# Patient Record
Sex: FEMALE | Race: WHITE | NOT HISPANIC OR LATINO | Employment: FULL TIME | URBAN - METROPOLITAN AREA
[De-identification: names, ages, dates, MRNs, and addresses within clinical notes are randomized per-mention and may not be internally consistent; named-entity substitution may affect disease eponyms.]

---

## 2020-12-14 ENCOUNTER — HOSPITAL ENCOUNTER (OUTPATIENT)
Dept: OTHER | Facility: HOSPITAL | Age: 35
Discharge: HOME | End: 2020-12-14

## 2020-12-14 LAB
ALBUMIN SERPL-MCNC: 4.2 G/DL (ref 3.5–5)
ALBUMIN/GLOB SERPL: 1.3 {RATIO} (ref 1.4–2.6)
ALP SERPL-CCNC: 107 U/L (ref 42–98)
ALT SERPL-CCNC: 21 U/L (ref 10–40)
ANION GAP SERPL CALC-SCNC: 14 MMOL/L (ref 8–19)
AST SERPL-CCNC: 21 U/L (ref 15–50)
BASOPHILS # BLD AUTO: 0.12 10*3/UL (ref 0–0.2)
BASOPHILS NFR BLD AUTO: 1 % (ref 0–3)
BILIRUB SERPL-MCNC: 0.26 MG/DL (ref 0.2–1.3)
BUN SERPL-MCNC: 13 MG/DL (ref 5–25)
BUN/CREAT SERPL: 17 {RATIO} (ref 6–20)
CALCIUM SERPL-MCNC: 9.5 MG/DL (ref 8.7–10.4)
CHLORIDE SERPL-SCNC: 103 MMOL/L (ref 99–111)
CONV ABS IMM GRAN: 0.07 10*3/UL (ref 0–0.2)
CONV CO2: 28 MMOL/L (ref 22–32)
CONV HIV-1/ HIV-2: NONREACTIVE
CONV IMMATURE GRAN: 0.6 % (ref 0–1.8)
CONV TOTAL PROTEIN: 7.5 G/DL (ref 6.3–8.2)
CREAT UR-MCNC: 0.75 MG/DL (ref 0.5–0.9)
DEPRECATED RDW RBC AUTO: 48.1 FL (ref 36.4–46.3)
EOSINOPHIL # BLD AUTO: 0.58 10*3/UL (ref 0–0.7)
EOSINOPHIL # BLD AUTO: 4.8 % (ref 0–7)
ERYTHROCYTE [DISTWIDTH] IN BLOOD BY AUTOMATED COUNT: 14 % (ref 11.7–14.4)
GFR SERPLBLD BASED ON 1.73 SQ M-ARVRAT: >60 ML/MIN/{1.73_M2}
GLOBULIN UR ELPH-MCNC: 3.3 G/DL (ref 2–3.5)
GLUCOSE SERPL-MCNC: 96 MG/DL (ref 65–99)
HCT VFR BLD AUTO: 46 % (ref 37–47)
HGB BLD-MCNC: 14.4 G/DL (ref 12–16)
LITHIUM SERPL-SCNC: 0.7 MEQ/L (ref 0.5–1.5)
LYMPHOCYTES # BLD AUTO: 3.1 10*3/UL (ref 1–5)
LYMPHOCYTES NFR BLD AUTO: 25.8 % (ref 20–45)
MCH RBC QN AUTO: 29.1 PG (ref 27–31)
MCHC RBC AUTO-ENTMCNC: 31.3 G/DL (ref 33–37)
MCV RBC AUTO: 92.9 FL (ref 81–99)
MONOCYTES # BLD AUTO: 0.67 10*3/UL (ref 0.2–1.2)
MONOCYTES NFR BLD AUTO: 5.6 % (ref 3–10)
NEUTROPHILS # BLD AUTO: 7.46 10*3/UL (ref 2–8)
NEUTROPHILS NFR BLD AUTO: 62.2 % (ref 30–85)
NRBC CBCN: 0 % (ref 0–0.7)
OSMOLALITY SERPL CALC.SUM OF ELEC: 290 MOSM/KG (ref 273–304)
PLATELET # BLD AUTO: 393 10*3/UL (ref 130–400)
PMV BLD AUTO: 10.6 FL (ref 9.4–12.3)
POTASSIUM SERPL-SCNC: 4.7 MMOL/L (ref 3.5–5.3)
RBC # BLD AUTO: 4.95 10*6/UL (ref 4.2–5.4)
RPR SER QL: NORMAL
SODIUM SERPL-SCNC: 140 MMOL/L (ref 135–147)
WBC # BLD AUTO: 12 10*3/UL (ref 4.8–10.8)

## 2022-05-25 ENCOUNTER — HOSPITAL ENCOUNTER (OUTPATIENT)
Dept: HOSPITAL 79 - LAB | Age: 37
End: 2022-05-25
Attending: OBSTETRICS & GYNECOLOGY
Payer: COMMERCIAL

## 2022-05-25 DIAGNOSIS — Z00.00: Primary | ICD-10-CM

## 2022-05-25 LAB
BUN/CREATININE RATIO: 25 (ref 0–10)
HGB BLD-MCNC: 15.6 GM/DL (ref 12.3–15.3)
RED BLOOD COUNT: 5.41 M/UL (ref 4–5.1)
WHITE BLOOD COUNT: 8.1 K/UL (ref 4.5–11)

## 2022-05-26 LAB — VITAMIN D, 25-HYDROXY: 26.8 NG/ML (ref 30–100)

## 2022-05-27 LAB — HCV AB: >11 (ref 0–0.9)

## 2022-05-28 LAB
QUANTIFERON MITOGEN VALUE: >10 IU/ML
QUANTIFERON NIL VALUE: 0.13 IU/ML
QUANTIFERON TB1 AG VALUE: 0.14 IU/ML
QUANTIFERON TB2 AG VALUE: 0.13 IU/ML

## 2023-12-13 ENCOUNTER — OFFICE VISIT (OUTPATIENT)
Dept: FAMILY MEDICINE CLINIC | Facility: CLINIC | Age: 38
End: 2023-12-13
Payer: MEDICAID

## 2023-12-13 VITALS
HEIGHT: 64 IN | HEART RATE: 69 BPM | OXYGEN SATURATION: 99 % | BODY MASS INDEX: 28.68 KG/M2 | DIASTOLIC BLOOD PRESSURE: 78 MMHG | WEIGHT: 168 LBS | SYSTOLIC BLOOD PRESSURE: 110 MMHG

## 2023-12-13 DIAGNOSIS — F33.1 MAJOR DEPRESSIVE DISORDER, RECURRENT EPISODE, MODERATE DEGREE: ICD-10-CM

## 2023-12-13 DIAGNOSIS — Z76.89 ENCOUNTER TO ESTABLISH CARE: Primary | ICD-10-CM

## 2023-12-13 DIAGNOSIS — Z11.59 ENCOUNTER FOR HCV SCREENING TEST FOR LOW RISK PATIENT: ICD-10-CM

## 2023-12-13 DIAGNOSIS — G62.9 PERIPHERAL POLYNEUROPATHY: ICD-10-CM

## 2023-12-13 DIAGNOSIS — Z79.899 HIGH RISK MEDICATION USE: ICD-10-CM

## 2023-12-13 DIAGNOSIS — Z13.0 SCREENING FOR DEFICIENCY ANEMIA: ICD-10-CM

## 2023-12-13 DIAGNOSIS — F31.9 BIPOLAR 1 DISORDER: ICD-10-CM

## 2023-12-13 DIAGNOSIS — G47.429 NARCOLEPSY DUE TO UNDERLYING CONDITION WITHOUT CATAPLEXY: ICD-10-CM

## 2023-12-13 DIAGNOSIS — Z13.220 SCREENING FOR LIPID DISORDERS: ICD-10-CM

## 2023-12-13 DIAGNOSIS — Z11.4 SCREENING FOR HIV (HUMAN IMMUNODEFICIENCY VIRUS): ICD-10-CM

## 2023-12-13 DIAGNOSIS — G25.81 RLS (RESTLESS LEGS SYNDROME): ICD-10-CM

## 2023-12-13 RX ORDER — CHOLECALCIFEROL (VITAMIN D3) 125 MCG
CAPSULE ORAL
COMMUNITY
Start: 2023-12-02 | End: 2023-12-13 | Stop reason: SDUPTHER

## 2023-12-13 RX ORDER — BUPROPION HYDROCHLORIDE 150 MG/1
150 TABLET ORAL EVERY MORNING
Qty: 30 TABLET | Refills: 2 | Status: SHIPPED | OUTPATIENT
Start: 2023-12-13

## 2023-12-13 RX ORDER — DOXEPIN HYDROCHLORIDE 10 MG/1
CAPSULE ORAL
COMMUNITY
Start: 2023-11-11 | End: 2023-12-13 | Stop reason: SDUPTHER

## 2023-12-13 RX ORDER — BUPRENORPHINE 300 MG/1
SOLUTION SUBCUTANEOUS
COMMUNITY
Start: 2023-11-20

## 2023-12-13 RX ORDER — GABAPENTIN 400 MG/1
400 CAPSULE ORAL 3 TIMES DAILY
Qty: 90 CAPSULE | Refills: 2 | Status: SHIPPED | OUTPATIENT
Start: 2023-12-13

## 2023-12-13 RX ORDER — CHOLECALCIFEROL (VITAMIN D3) 125 MCG
5 CAPSULE ORAL NIGHTLY
Qty: 30 EACH | Refills: 2 | Status: SHIPPED | OUTPATIENT
Start: 2023-12-13

## 2023-12-13 RX ORDER — CARIPRAZINE 3 MG/1
3 CAPSULE, GELATIN COATED ORAL DAILY
Qty: 30 CAPSULE | Refills: 2 | Status: SHIPPED | OUTPATIENT
Start: 2023-12-13 | End: 2024-03-12

## 2023-12-13 RX ORDER — BUPROPION HYDROCHLORIDE 150 MG/1
TABLET ORAL
COMMUNITY
Start: 2023-11-13 | End: 2023-12-13 | Stop reason: SDUPTHER

## 2023-12-13 RX ORDER — CARIPRAZINE 3 MG/1
CAPSULE, GELATIN COATED ORAL
COMMUNITY
Start: 2023-11-14 | End: 2023-12-13 | Stop reason: SDUPTHER

## 2023-12-13 RX ORDER — ROPINIROLE 0.5 MG/1
TABLET, FILM COATED ORAL
Qty: 60 TABLET | Refills: 1 | Status: SHIPPED | OUTPATIENT
Start: 2023-12-13

## 2023-12-13 RX ORDER — DOXEPIN HYDROCHLORIDE 10 MG/1
10 CAPSULE ORAL NIGHTLY
Qty: 30 CAPSULE | Refills: 2 | Status: SHIPPED | OUTPATIENT
Start: 2023-12-13

## 2023-12-13 NOTE — PROGRESS NOTES
"    New Patient Office Visit      Date: 2023   Patient Name: Jose Coyle  : 1985   MRN: [id number]     Chief Complaint:    Chief Complaint   Patient presents with    Establish Care       History of Present Illness: Jose Coyle is a 38 y.o. female who is here today to establish care.      Subjective      HPI:  Patient recently moved from [place].  Staying in [facility].      History of polysubstance use (mainly methamphetamines, some fentanyl).  Has been sober now for 18 months.      Diagnosed w Bipolar Type 1, major depressive disorder and anxiety. Was hospitalized 10/31/23 during a \"psychosis\" episode. States that she and her  were working at [employer] and doing well up until a couple of months ago when she lost her job.  After this she abruptly stopped taking all of her medications which prompted her reason for hospitalization.  She was restarted on medications including Wellbutrin 150 mg, Vraylar 3 mg daily.  Has been on the Vraylar for 3 years now and reports she has done really well with this.  Was on lithium several years ago as well as Prozac but failed both of these. Prior to recent hospitalization her mood symptoms had been controlled.  She now still struggles with some anxiety and \"gloominess.\"     On Sublocade injections.  Needs to find a new provider to prescribe this for her.  States that she can be [facility] and plans to do this soon.    Has neuropathy and narcolepsy (diagnosed by sleep medicine physician approx 10 years ago).  States that she falls asleep even on methamphetamines.  Request sleep medicine referral.  She was also diagnosed with restless leg.  Previously took ropinirole for this which did help her symptoms at some point she never had this refilled.  Requesting to start back on this today.  She also takes doxepin and melatonin to help with sleep.    Struggles w numbness " and tingling in both feet.  Has been on gabapentin 400 mg 3 times daily which helps control her symptoms.    States that her  has been diagnosed with HCV but she has always tested negative.  They have unprotected intercourse and have shared needles in the past.    Had been previously started on Remeron after hospitalization.  Had a 30 pound weight gain over a 2-month period so this was stopped.  Eventually would like to discuss weight loss.  Feels like her diet is relatively healthy.  Typically eats 3 small meals a day.  Does admit that she eats more carbs than she probably should.  BMI is 28.84.    Review of Systems:   Negative/not pertinent unless otherwise noted above in HPI.     Past Medical History:   Past Medical History:   Diagnosis Date    Bipolar 1 disorder     Chronic pain     Depression        Past Surgical History: History reviewed. No pertinent surgical history.    Family History: History reviewed. No pertinent family history.    Social History:   Social History     Socioeconomic History    Marital status:    Tobacco Use    Smoking status: Every Day     Packs/day: 1     Types: Cigarettes    Smokeless tobacco: Never   Vaping Use    Vaping Use: Every day   Substance and Sexual Activity    Alcohol use: Not Currently    Drug use: Not Currently     Comment: fentanyl       Medications:     Current Outpatient Medications:     buPROPion XL (WELLBUTRIN XL) 150 MG 24 hr tablet, Take 1 tablet by mouth Every Morning., Disp: 30 tablet, Rfl: 2    doxepin (SINEquan) 10 MG capsule, Take 1 capsule by mouth Every Night., Disp: 30 capsule, Rfl: 2    gabapentin (NEURONTIN) 400 MG capsule, Take 1 capsule by mouth 3 (Three) Times a Day., Disp: 90 capsule, Rfl: 2    melatonin 5 MG tablet tablet, Take 1 tablet by mouth Every Night., Disp: 30 each, Rfl: 2    Sublocade 300 MG/1.5ML solution prefilled syringe, INJECT 300MG SUBCUTANEOUSLY ONCE MONTHLY, Disp: , Rfl:     Vraylar 3 MG capsule capsule, Take 1 capsule by  "mouth Daily for 90 days., Disp: 30 capsule, Rfl: 2    rOPINIRole (REQUIP) 0.5 MG tablet, Take 1/2 tablet at bedtime for three days, then increase to full tablet at bedtime for three days, then take 2 tablets at bedtime., Disp: 60 tablet, Rfl: 1    Allergies:   No Known Allergies    Immunizations:  Immunization History   Administered Date(s) Administered    Fluzone (or Fluarix & Flulaval for VFC) >6mos 12/13/2023    Hepatitis B Adolescent High Risk Infant 05/13/1999, 06/15/1999    OPV 08/24/1998    Td (TDVAX) 08/24/1998     Hep C (Age 18-79 once):  previously negative, reordered today  HIV (Age 15-65 once): previously negative, reordered today  A1c: ordered  Lipid panel: ordered      Tobacco Use: High Risk (12/13/2023)    Patient History     Smoking Tobacco Use: Every Day     Smokeless Tobacco Use: Never     Passive Exposure: Not on file       Social History     Substance and Sexual Activity   Alcohol Use Not Currently        Social History     Substance and Sexual Activity   Drug Use Not Currently    Comment: fentanyl      Sexual Health: not using contraception, not attempting pregnancy   Menopause: pre-menopausal    PHQ-2 Depression Screening  Little interest or pleasure in doing things? 0-->not at all   Feeling down, depressed, or hopeless? 0-->not at all   PHQ-2 Total Score 0     PHQ-9 Total Score: 0     Osteoporosis:   Post menopausal women < 65 with RF (advancing age, previous fracture, glucocorticoid therapy, parental hip fracture, low body weight, current cigarette smoking, excessive alcohol consumption, rheumatoid arthritis, secondary osteoporosis [hypogonadism/premature menopause, malabsorption, chronic liver disease, IBD]).  All women 65 or older    Objective     Physical Exam:  Vital Signs:   Vitals:    12/13/23 0950   BP: 110/78   BP Location: Left arm   Patient Position: Sitting   Cuff Size: Adult   Pulse: 69   SpO2: 99%   Weight: 76.2 kg (168 lb)   Height: 162.6 cm (64\")     Body mass index is 28.84 " kg/m².    Physical Exam  Constitutional:       Appearance: She is normal weight. She is not ill-appearing.   HENT:      Head: Normocephalic and atraumatic.      Mouth/Throat:      Mouth: Mucous membranes are moist.      Pharynx: Oropharynx is clear. No oropharyngeal exudate or posterior oropharyngeal erythema.   Eyes:      Extraocular Movements: Extraocular movements intact.      Conjunctiva/sclera: Conjunctivae normal.   Cardiovascular:      Rate and Rhythm: Normal rate and regular rhythm.      Heart sounds: Normal heart sounds.   Pulmonary:      Breath sounds: Normal breath sounds. No wheezing or rhonchi.   Musculoskeletal:         General: Normal range of motion.      Cervical back: Normal range of motion and neck supple.   Lymphadenopathy:      Cervical: No cervical adenopathy.   Neurological:      General: No focal deficit present.      Mental Status: She is alert.   Psychiatric:         Mood and Affect: Mood normal.         Thought Content: Thought content normal.             Labs:   TSH   Date Value Ref Range Status   05/30/2021 0.693 0.270 - 4.200 m[iU]/L Final          Assessment / Plan      Assessment/Plan:   Diagnoses and all orders for this visit:    1. Encounter to establish care (Primary)  -     Comprehensive Metabolic Panel; Future  -     CBC & Differential; Future  -     Lipid Panel With / Chol / HDL Ratio; Future  -     TSH Rfx On Abnormal To Free T4; Future  -     Hemoglobin A1c; Future    2. Bipolar 1 disorder  Assessment & Plan:  Recent hospitalization on 10/31/2023 for manic episode due to medication noncompliance  Patient reports symptoms are well-controlled now on her Vraylar and Wellbutrin  Refills were sent for both medications  Will likely need referral to behavioral health in the future    Orders:  -     Vraylar 3 MG capsule capsule; Take 1 capsule by mouth Daily for 90 days.  Dispense: 30 capsule; Refill: 2    3. Major depressive disorder, recurrent episode, moderate degree  Assessment &  Plan:  Depressive symptoms are improving after restarting her medications but remains suboptimally controlled  Has been out of Wellbutrin for the last 3 days, will restart at previous dose of 150 mg daily  May consider increasing this in the future    Orders:  -     buPROPion XL (WELLBUTRIN XL) 150 MG 24 hr tablet; Take 1 tablet by mouth Every Morning.  Dispense: 30 tablet; Refill: 2    4. Narcolepsy due to underlying condition without cataplexy  Assessment & Plan:  Referral placed to sleep medicine  Refilled doxepin and melatonin    Orders:  -     Ambulatory Referral to Sleep Medicine  -     doxepin (SINEquan) 10 MG capsule; Take 1 capsule by mouth Every Night.  Dispense: 30 capsule; Refill: 2  -     melatonin 5 MG tablet tablet; Take 1 tablet by mouth Every Night.  Dispense: 30 each; Refill: 2    5. RLS (restless legs syndrome)  Assessment & Plan:  Uncontrolled  Restart ropinirole (patient has done well on this therapy in the past)    Orders:  -     rOPINIRole (REQUIP) 0.5 MG tablet; Take 1/2 tablet at bedtime for three days, then increase to full tablet at bedtime for three days, then take 2 tablets at bedtime.  Dispense: 60 tablet; Refill: 1    6. Peripheral polyneuropathy  Assessment & Plan:  CSA and UDS today  Refilled gabapentin 40 mg 3 times daily    Orders:  -     gabapentin (NEURONTIN) 400 MG capsule; Take 1 capsule by mouth 3 (Three) Times a Day.  Dispense: 90 capsule; Refill: 2    7. Screening for HIV (human immunodeficiency virus)  -     HIV-1/O/2 Ag/Ab w Reflex; Future    8. Encounter for HCV screening test for low risk patient  -     HCV Antibody Rfx To Qnt PCR; Future    9. Screening for lipid disorders  -     Lipid Panel With / Chol / HDL Ratio; Future    10. Screening for deficiency anemia  -     CBC & Differential; Future    Other orders  -     Fluzone >6 Months (2313-3721)        Healthcare Maintenance:  Counseling provided based on age appropriate USPSTF guidelines.  BMI is >= 25 and <30.  (Overweight) The following options were offered after discussion;: exercise counseling/recommendations and nutrition counseling/recommendations    Jose Coyle voices understanding and acceptance of this advice and will call back with any further questions or concerns. AVS with preventive healthcare tips printed for patient.     “Discussed risks/benefits to vaccination, reviewed components of the vaccine, discussed VIS, discussed informed consent, informed consent obtained. Patient/Parent was allowed to accept or refuse vaccine. Questions answered to satisfactory state of patient/Parent. We reviewed typical age appropriate and seasonally appropriate vaccinations. Reviewed immunization history and updated state vaccination form as needed. Patient was counseled on Influenza    Follow Up:   Return for FU in 4 to 6 weeks for annual/med check 30 minute appt .        DO MELY [address]

## 2023-12-13 NOTE — ASSESSMENT & PLAN NOTE
Recent hospitalization on 10/31/2023 for manic episode due to medication noncompliance  Patient reports symptoms are well-controlled now on her Vraylar and Wellbutrin  Refills were sent for both medications  Will likely need referral to behavioral health in the future

## 2023-12-13 NOTE — ASSESSMENT & PLAN NOTE
Depressive symptoms are improving after restarting her medications but remains suboptimally controlled  Has been out of Wellbutrin for the last 3 days, will restart at previous dose of 150 mg daily  May consider increasing this in the future

## 2023-12-20 LAB — DRUGS UR: NORMAL

## 2024-01-11 ENCOUNTER — OFFICE VISIT (OUTPATIENT)
Dept: FAMILY MEDICINE CLINIC | Facility: CLINIC | Age: 39
End: 2024-01-11
Payer: MEDICAID

## 2024-01-11 VITALS
HEART RATE: 68 BPM | DIASTOLIC BLOOD PRESSURE: 80 MMHG | BODY MASS INDEX: 29.88 KG/M2 | SYSTOLIC BLOOD PRESSURE: 124 MMHG | OXYGEN SATURATION: 98 % | HEIGHT: 64 IN | WEIGHT: 175 LBS

## 2024-01-11 DIAGNOSIS — F33.1 MAJOR DEPRESSIVE DISORDER, RECURRENT EPISODE, MODERATE DEGREE: ICD-10-CM

## 2024-01-11 DIAGNOSIS — Z12.31 ENCOUNTER FOR SCREENING MAMMOGRAM FOR MALIGNANT NEOPLASM OF BREAST: ICD-10-CM

## 2024-01-11 DIAGNOSIS — N89.8 VAGINAL ODOR: ICD-10-CM

## 2024-01-11 DIAGNOSIS — Z11.3 ROUTINE SCREENING FOR STI (SEXUALLY TRANSMITTED INFECTION): ICD-10-CM

## 2024-01-11 DIAGNOSIS — Z00.00 ANNUAL PHYSICAL EXAM: Primary | ICD-10-CM

## 2024-01-11 DIAGNOSIS — F17.200 NICOTINE DEPENDENCE WITH CURRENT USE: ICD-10-CM

## 2024-01-11 DIAGNOSIS — F90.9 ATTENTION DEFICIT HYPERACTIVITY DISORDER (ADHD), UNSPECIFIED ADHD TYPE: ICD-10-CM

## 2024-01-11 DIAGNOSIS — E66.9 CLASS 1 OBESITY: ICD-10-CM

## 2024-01-11 DIAGNOSIS — F31.9 BIPOLAR 1 DISORDER: ICD-10-CM

## 2024-01-11 DIAGNOSIS — Z12.4 SCREENING FOR MALIGNANT NEOPLASM OF CERVIX: ICD-10-CM

## 2024-01-11 PROCEDURE — 87798 DETECT AGENT NOS DNA AMP: CPT | Performed by: STUDENT IN AN ORGANIZED HEALTH CARE EDUCATION/TRAINING PROGRAM

## 2024-01-11 PROCEDURE — 87801 DETECT AGNT MULT DNA AMPLI: CPT | Performed by: STUDENT IN AN ORGANIZED HEALTH CARE EDUCATION/TRAINING PROGRAM

## 2024-01-11 PROCEDURE — 87491 CHLMYD TRACH DNA AMP PROBE: CPT | Performed by: STUDENT IN AN ORGANIZED HEALTH CARE EDUCATION/TRAINING PROGRAM

## 2024-01-11 PROCEDURE — 87591 N.GONORRHOEAE DNA AMP PROB: CPT | Performed by: STUDENT IN AN ORGANIZED HEALTH CARE EDUCATION/TRAINING PROGRAM

## 2024-01-11 PROCEDURE — 87661 TRICHOMONAS VAGINALIS AMPLIF: CPT | Performed by: STUDENT IN AN ORGANIZED HEALTH CARE EDUCATION/TRAINING PROGRAM

## 2024-01-11 RX ORDER — BUPROPION HYDROCHLORIDE 300 MG/1
300 TABLET ORAL EVERY MORNING
Qty: 90 TABLET | Refills: 1 | Status: SHIPPED | OUTPATIENT
Start: 2024-01-11

## 2024-01-11 RX ORDER — LOFEXIDINE HYDROCHLORIDE 0.2 MG/1
TABLET, FILM COATED ORAL
COMMUNITY
Start: 2024-01-09

## 2024-01-11 NOTE — PROGRESS NOTES
"    Female Physical Note      Date: 2024   Patient Name: Jose Coyle  : 1985   MRN: [id number]     Chief Complaint:    Chief Complaint   Patient presents with    Annual Exam     Weight gain, pap smear        History of Present Illness: Jose Coyle is a 38 y.o. female with Bipolar 1 d/o, depression, RLS, peripheral neuropathy, nicotine use who is here today for their annual health maintenance and physical.    HPI  Continues to struggle w weight gain. Weight today is 175 lbs, up 7 lbs from appt last month. Feels like she's not eating much, usually has 4-5 small portions/day.  Trying to eat more protein heavy meals and limiting carbohydrates.  She walks approximately 1 mile per day.  Does not get regular exercise outside of this.  She is on Wellbutrin 150 mg daily.  Has not noticed any weight loss with starting this.  Feels like she drinks plenty of water through the day.  Sleep is described as good.    Has noticed a vaginal odor over the last few weeks.  Denies any abnormal discharge.  She is status post hysterectomy in  and has not had a period since then.  States that the hysterectomy was for \"female problems.\"  She had abnormal Pap smears prior to that.  Does not know if she still has her cervix.  Last pap smear was around 10 years ago. Sexually active w . Reports being told 10-20 years ago that she had herpes but denies any active flare ups or lesions.     Smoking 1 PPD for 25 years. Not interested in quitting.   Never had a mammogram.     Subjective      Review of Systems:   Review of Systems   Constitutional:  Positive for fatigue and unexpected weight gain. Negative for appetite change.   HENT:  Negative for sore throat.    Respiratory:  Negative for shortness of breath.    Cardiovascular:  Negative for chest pain.   Genitourinary:  Positive for amenorrhea. Negative for vaginal bleeding and vaginal discharge.       Past Medical History, Social History, Family History " and Care Team were all reviewed with patient and updated as appropriate.     Medications:     Current Outpatient Medications:     buPROPion XL (WELLBUTRIN XL) 300 MG 24 hr tablet, Take 1 tablet by mouth Every Morning., Disp: 90 tablet, Rfl: 1    doxepin (SINEquan) 10 MG capsule, Take 1 capsule by mouth Every Night., Disp: 30 capsule, Rfl: 2    gabapentin (NEURONTIN) 400 MG capsule, Take 1 capsule by mouth 3 (Three) Times a Day., Disp: 90 capsule, Rfl: 2    Lucemyra 0.18 MG tablet, , Disp: , Rfl:     melatonin 5 MG tablet tablet, Take 1 tablet by mouth Every Night., Disp: 30 each, Rfl: 2    rOPINIRole (REQUIP) 0.5 MG tablet, Take 1/2 tablet at bedtime for three days, then increase to full tablet at bedtime for three days, then take 2 tablets at bedtime., Disp: 60 tablet, Rfl: 1    Sublocade 300 MG/1.5ML solution prefilled syringe, INJECT 300MG SUBCUTANEOUSLY ONCE MONTHLY, Disp: , Rfl:     Vraylar 3 MG capsule capsule, Take 1 capsule by mouth Daily for 90 days., Disp: 30 capsule, Rfl: 2    Allergies:   No Known Allergies    Immunizations:  Td/Tdap(Booster Q 10 yrs):  UTD  Flu (Yearly):  UTD  Pneumonia: Plan for next visit.  Immunization History   Administered Date(s) Administered    Fluzone (or Fluarix & Flulaval for VFC) >6mos 12/13/2023    Hepatitis B Adolescent High Risk Infant 05/13/1999, 06/15/1999    OPV 08/24/1998    Td (TDVAX) 08/24/1998     Pap:  Today (vaginal, h/o abnornal pap smear)   Mammogram ordered    Tobacco Use: High Risk (1/11/2024)    Patient History     Smoking Tobacco Use: Every Day     Smokeless Tobacco Use: Never     Passive Exposure: Not on file       Social History     Substance and Sexual Activity   Alcohol Use Not Currently        Social History     Substance and Sexual Activity   Drug Use Not Currently    Comment: fentanyl        Diet/Physical activity: See HPI  Menopause: post surgical (2009)      Objective     Physical Exam:  Vital Signs:   Vitals:    01/11/24 0858   BP: 124/80   BP  "Location: Left arm   Patient Position: Sitting   Cuff Size: Adult   Pulse: 68   SpO2: 98%   Weight: 79.4 kg (175 lb)   Height: 162.6 cm (64\")     Body mass index is 30.04 kg/m².     Physical Exam  Constitutional:       Appearance: She is normal weight. She is not ill-appearing.   HENT:      Head: Normocephalic and atraumatic.      Right Ear: Tympanic membrane normal.      Left Ear: Tympanic membrane normal.      Mouth/Throat:      Mouth: Mucous membranes are moist.      Pharynx: Oropharynx is clear. No oropharyngeal exudate or posterior oropharyngeal erythema.   Eyes:      Extraocular Movements: Extraocular movements intact.      Conjunctiva/sclera: Conjunctivae normal.   Cardiovascular:      Rate and Rhythm: Normal rate and regular rhythm.      Heart sounds: Normal heart sounds.   Pulmonary:      Breath sounds: Normal breath sounds. No wheezing or rhonchi.   Abdominal:      General: Abdomen is flat.      Palpations: Abdomen is soft.      Tenderness: There is no abdominal tenderness.   Genitourinary:     Comments: Labia minora and majora without lesions or rash  Urethra normal, patent, without lesions or discharge  Introitus without lesions or evidence of trauma  Vagina with scant white mucoid discharge, no lesions  Cervix and uterus absent  Perineum intact without lesions  Anus without hemorrhoids or lesions    Musculoskeletal:         General: Normal range of motion.      Cervical back: Normal range of motion and neck supple.   Lymphadenopathy:      Cervical: No cervical adenopathy.   Neurological:      General: No focal deficit present.      Mental Status: She is alert.   Psychiatric:         Mood and Affect: Mood normal.         Thought Content: Thought content normal.         Assessment / Plan      Assessment/Plan:   Diagnoses and all orders for this visit:    1. Annual physical exam (Primary)    2. Major depressive disorder, recurrent episode, moderate degree  Assessment & Plan:  Will increase Wellbutrin to " 300mg  Cont Vraylar  Referral to     Orders:  -     buPROPion XL (WELLBUTRIN XL) 300 MG 24 hr tablet; Take 1 tablet by mouth Every Morning.  Dispense: 90 tablet; Refill: 1  -     Ambulatory Referral to Psychiatry    3. Bipolar 1 disorder  Assessment & Plan:  Recent hospitalization on 10/31/2023 for manic episode due to medication noncompliance  Patient reports symptoms are well-controlled now on her Vraylar and Wellbutrin  Will increase Wellbutrin to 300mg to help w mood as well as poss weight loss benefit  Referral to Behavioral Health for further med management    Orders:  -     Ambulatory Referral to Psychiatry    4. Attention deficit hyperactivity disorder (ADHD), unspecified ADHD type  -     Ambulatory Referral to Psychiatry    5. Vaginal odor  -     NuSwab VG+ - Swab, Vagina; Future  -     NuSwab VG+ - Swab, Vagina    6. Class 1 obesity  Assessment & Plan:  Patient's (Body mass index is 30.04 kg/m².) indicates that they are obese (BMI >30) with health conditions that include none . Weight is worsening. BMI  is above average; BMI management plan is completed. We discussed low calorie, low carb based diet program, portion control, increasing exercise, and Information on healthy weight added to patient's after visit summary.       7. Nicotine dependence with current use  Assessment & Plan:  Tobacco use is unchanged.  Smoking cessation counseling was provided.  Tobacco use will be reassessed in 3 months.  Pt is NOT ready to quit  25 pack year history      8. Routine screening for STI (sexually transmitted infection)  -     HSV 1 & 2 - Specific Antibody, IgG; Future    9. Screening for malignant neoplasm of cervix  -     LIQUID-BASED PAP SMEAR WITH HPV GENOTYPING REGARDLESS OF INTERPRETATION (DERRELL,COR,MAD); Future  -     LIQUID-BASED PAP SMEAR WITH HPV GENOTYPING REGARDLESS OF INTERPRETATION (DERRELL,COR,MAD)    10. Encounter for screening mammogram for malignant neoplasm of breast  -     Mammo Screening Digital  Tomosynthesis Bilateral With CAD; Future         Healthcare Maintenance:  Needs pneumonia vaccination at FU- forgot to administer today  Fasting labs today  Mammogram ordered  Pap (vaginal swab) today  Counseling provided based on age appropriate USPSTF guidelines. Preventive counseling and anticipatory guidance discussed on the following topics: nutrition, physical activity, healthy weight, misuse of tobacco, mental health, immunizations, screenings, and self-breast exam         Jose Shoshana Coyle voices understanding and acceptance of this advice and will call back with any further questions or concerns. AVS with preventive healthcare tips printed for patient.         Follow Up:   Return in about 3 months (around 4/11/2024) for FU weight check.          Annabel Warner DO  [facility] [address]

## 2024-01-11 NOTE — ASSESSMENT & PLAN NOTE
Patient's (Body mass index is 30.04 kg/m².) indicates that they are obese (BMI >30) with health conditions that include none . Weight is worsening. BMI  is above average; BMI management plan is completed. We discussed low calorie, low carb based diet program, portion control, increasing exercise, and Information on healthy weight added to patient's after visit summary.

## 2024-01-11 NOTE — ASSESSMENT & PLAN NOTE
Tobacco use is unchanged.  Smoking cessation counseling was provided.  Tobacco use will be reassessed in 3 months.  Pt is NOT ready to quit  25 pack year history

## 2024-01-11 NOTE — ASSESSMENT & PLAN NOTE
Recent hospitalization on 10/31/2023 for manic episode due to medication noncompliance  Patient reports symptoms are well-controlled now on her Vraylar and Wellbutrin  Will increase Wellbutrin to 300mg to help w mood as well as poss weight loss benefit  Referral to Behavioral Health for further med management

## 2024-01-16 LAB
A VAGINAE DNA VAG QL NAA+PROBE: ABNORMAL SCORE
BVAB2 DNA VAG QL NAA+PROBE: ABNORMAL SCORE
C ALBICANS DNA VAG QL NAA+PROBE: NEGATIVE
C GLABRATA DNA VAG QL NAA+PROBE: NEGATIVE
C TRACH DNA VAG QL NAA+PROBE: NEGATIVE
MEGA1 DNA VAG QL NAA+PROBE: ABNORMAL SCORE
N GONORRHOEA DNA VAG QL NAA+PROBE: NEGATIVE
REF LAB TEST METHOD: NORMAL
T VAGINALIS DNA VAG QL NAA+PROBE: NEGATIVE

## 2024-03-13 ENCOUNTER — HOSPITAL ENCOUNTER (EMERGENCY)
Facility: HOSPITAL | Age: 39
Discharge: LEFT BEFORE DISCHARGE | End: 2024-03-13
Attending: EMERGENCY MEDICINE | Admitting: EMERGENCY MEDICINE
Payer: MEDICAID

## 2024-03-13 VITALS
BODY MASS INDEX: 25.71 KG/M2 | WEIGHT: 150.57 LBS | DIASTOLIC BLOOD PRESSURE: 75 MMHG | HEART RATE: 91 BPM | TEMPERATURE: 97.6 F | RESPIRATION RATE: 22 BRPM | OXYGEN SATURATION: 98 % | HEIGHT: 64 IN | SYSTOLIC BLOOD PRESSURE: 97 MMHG

## 2024-03-13 DIAGNOSIS — R10.84 GENERALIZED ABDOMINAL PAIN: Primary | ICD-10-CM

## 2024-03-13 LAB
ALBUMIN SERPL-MCNC: 4.5 G/DL (ref 3.5–5.2)
ALBUMIN/GLOB SERPL: 1.5 G/DL
ALP SERPL-CCNC: 76 U/L (ref 39–117)
ALT SERPL W P-5'-P-CCNC: 19 U/L (ref 1–33)
AMPHET+METHAMPHET UR QL: POSITIVE
ANION GAP SERPL CALCULATED.3IONS-SCNC: 13.7 MMOL/L (ref 5–15)
AST SERPL-CCNC: 32 U/L (ref 1–32)
BACTERIA UR QL AUTO: ABNORMAL /HPF
BARBITURATES UR QL SCN: NEGATIVE
BASOPHILS # BLD AUTO: 0.15 10*3/MM3 (ref 0–0.2)
BASOPHILS NFR BLD AUTO: 1.3 % (ref 0–1.5)
BENZODIAZ UR QL SCN: NEGATIVE
BILIRUB SERPL-MCNC: 0.5 MG/DL (ref 0–1.2)
BILIRUB UR QL STRIP: ABNORMAL
BUN SERPL-MCNC: 15 MG/DL (ref 6–20)
BUN/CREAT SERPL: 19.7 (ref 7–25)
CALCIUM SPEC-SCNC: 9.6 MG/DL (ref 8.6–10.5)
CANNABINOIDS SERPL QL: NEGATIVE
CHLORIDE SERPL-SCNC: 101 MMOL/L (ref 98–107)
CLARITY UR: ABNORMAL
CO2 SERPL-SCNC: 24.3 MMOL/L (ref 22–29)
COCAINE UR QL: NEGATIVE
COLOR UR: ABNORMAL
CREAT SERPL-MCNC: 0.76 MG/DL (ref 0.57–1)
DEPRECATED RDW RBC AUTO: 38.6 FL (ref 37–54)
EGFRCR SERPLBLD CKD-EPI 2021: 101.1 ML/MIN/1.73
EOSINOPHIL # BLD AUTO: 0.6 10*3/MM3 (ref 0–0.4)
EOSINOPHIL NFR BLD AUTO: 5 % (ref 0.3–6.2)
ERYTHROCYTE [DISTWIDTH] IN BLOOD BY AUTOMATED COUNT: 12.4 % (ref 12.3–15.4)
FENTANYL UR-MCNC: NEGATIVE NG/ML
GLOBULIN UR ELPH-MCNC: 3.1 GM/DL
GLUCOSE SERPL-MCNC: 98 MG/DL (ref 65–99)
GLUCOSE UR STRIP-MCNC: NEGATIVE MG/DL
HCG INTACT+B SERPL-ACNC: <0.5 MIU/ML
HCT VFR BLD AUTO: 46.9 % (ref 34–46.6)
HGB BLD-MCNC: 15.3 G/DL (ref 12–15.9)
HGB UR QL STRIP.AUTO: NEGATIVE
HOLD SPECIMEN: NORMAL
HOLD SPECIMEN: NORMAL
HYALINE CASTS UR QL AUTO: ABNORMAL /LPF
IMM GRANULOCYTES # BLD AUTO: 0.04 10*3/MM3 (ref 0–0.05)
IMM GRANULOCYTES NFR BLD AUTO: 0.3 % (ref 0–0.5)
KETONES UR QL STRIP: ABNORMAL
LEUKOCYTE ESTERASE UR QL STRIP.AUTO: ABNORMAL
LIPASE SERPL-CCNC: 12 U/L (ref 13–60)
LYMPHOCYTES # BLD AUTO: 4.81 10*3/MM3 (ref 0.7–3.1)
LYMPHOCYTES NFR BLD AUTO: 40.3 % (ref 19.6–45.3)
MCH RBC QN AUTO: 28.1 PG (ref 26.6–33)
MCHC RBC AUTO-ENTMCNC: 32.6 G/DL (ref 31.5–35.7)
MCV RBC AUTO: 86.2 FL (ref 79–97)
METHADONE UR QL SCN: NEGATIVE
MONOCYTES # BLD AUTO: 1.19 10*3/MM3 (ref 0.1–0.9)
MONOCYTES NFR BLD AUTO: 10 % (ref 5–12)
MUCOUS THREADS URNS QL MICRO: ABNORMAL /HPF
NEUTROPHILS NFR BLD AUTO: 43.1 % (ref 42.7–76)
NEUTROPHILS NFR BLD AUTO: 5.15 10*3/MM3 (ref 1.7–7)
NITRITE UR QL STRIP: NEGATIVE
NRBC BLD AUTO-RTO: 0 /100 WBC (ref 0–0.2)
OPIATES UR QL: NEGATIVE
OXYCODONE UR QL SCN: NEGATIVE
PH UR STRIP.AUTO: <=5 [PH] (ref 5–8)
PLATELET # BLD AUTO: 437 10*3/MM3 (ref 140–450)
PMV BLD AUTO: 10.8 FL (ref 6–12)
POTASSIUM SERPL-SCNC: 3.6 MMOL/L (ref 3.5–5.2)
PROT SERPL-MCNC: 7.6 G/DL (ref 6–8.5)
PROT UR QL STRIP: ABNORMAL
RBC # BLD AUTO: 5.44 10*6/MM3 (ref 3.77–5.28)
RBC # UR STRIP: ABNORMAL /HPF
REF LAB TEST METHOD: ABNORMAL
SODIUM SERPL-SCNC: 139 MMOL/L (ref 136–145)
SP GR UR STRIP: >1.03 (ref 1–1.03)
SQUAMOUS #/AREA URNS HPF: ABNORMAL /HPF
UROBILINOGEN UR QL STRIP: ABNORMAL
WBC # UR STRIP: ABNORMAL /HPF
WBC NRBC COR # BLD AUTO: 11.94 10*3/MM3 (ref 3.4–10.8)
WHOLE BLOOD HOLD COAG: NORMAL
WHOLE BLOOD HOLD SPECIMEN: NORMAL
YEAST URNS QL MICRO: ABNORMAL /HPF

## 2024-03-13 PROCEDURE — 84702 CHORIONIC GONADOTROPIN TEST: CPT | Performed by: EMERGENCY MEDICINE

## 2024-03-13 PROCEDURE — 36415 COLL VENOUS BLD VENIPUNCTURE: CPT

## 2024-03-13 PROCEDURE — 85025 COMPLETE CBC W/AUTO DIFF WBC: CPT | Performed by: EMERGENCY MEDICINE

## 2024-03-13 PROCEDURE — 80307 DRUG TEST PRSMV CHEM ANLYZR: CPT | Performed by: EMERGENCY MEDICINE

## 2024-03-13 PROCEDURE — 99283 EMERGENCY DEPT VISIT LOW MDM: CPT

## 2024-03-13 PROCEDURE — 81001 URINALYSIS AUTO W/SCOPE: CPT | Performed by: EMERGENCY MEDICINE

## 2024-03-13 PROCEDURE — 83690 ASSAY OF LIPASE: CPT | Performed by: EMERGENCY MEDICINE

## 2024-03-13 PROCEDURE — 80053 COMPREHEN METABOLIC PANEL: CPT | Performed by: EMERGENCY MEDICINE

## 2024-03-13 RX ORDER — SODIUM CHLORIDE 0.9 % (FLUSH) 0.9 %
10 SYRINGE (ML) INJECTION AS NEEDED
Status: DISCONTINUED | OUTPATIENT
Start: 2024-03-13 | End: 2024-03-13 | Stop reason: HOSPADM

## 2024-03-14 NOTE — ED PROVIDER NOTES
"Subjective   History of Present Illness  The patient presents to the emergency department and is rolling around in the bed unable to hold still.  She states that she has had generalized abdominal pain for 1 week.  She states she is and was told by her mother it was a GI bleed.  She states she has had no red or dark red blood in her stools.  She states that she has not had any black or tarry stools.  She states she has had no nausea vomiting or diarrhea.  She denies any urinary symptoms.  She reports no back pain.  When discussing the treatment plan with the patient she states she does \"do not have time for this\".  The patient seems to be very emotional but is alert and oriented x 3.  She was made aware that she could have a serious medical condition and still does not want to stay for any further testing.  She states \"I do not want any fucking IV\".  The patient has someone with her that states that they will take her to [facility] if she leaves here.  Patient has numerous open sores to her upper and lower extremities.    History provided by:  Patient   used: No        Review of Systems   Constitutional:  Negative for chills and fever.   HENT:  Negative for congestion, ear pain and sore throat.    Eyes:  Negative for pain.   Respiratory:  Negative for cough, chest tightness and shortness of breath.    Cardiovascular:  Negative for chest pain.   Gastrointestinal:  Positive for abdominal pain. Negative for anal bleeding, blood in stool, diarrhea, nausea and vomiting.   Genitourinary:  Negative for flank pain and hematuria.   Musculoskeletal:  Negative for back pain, joint swelling, neck pain and neck stiffness.   Skin:  Negative for pallor and rash.   Neurological:  Negative for seizures and headaches.   All other systems reviewed and are negative.      Past Medical History:   Diagnosis Date    Bipolar 1 disorder     Chronic pain     Depression        No Known Allergies    History reviewed. No " pertinent surgical history.    History reviewed. No pertinent family history.    Social History     Socioeconomic History    Marital status:    Tobacco Use    Smoking status: Every Day     Current packs/day: 1.00     Types: Cigarettes    Smokeless tobacco: Never   Vaping Use    Vaping status: Every Day   Substance and Sexual Activity    Alcohol use: Not Currently    Drug use: Not Currently     Comment: fentanyl           Objective   Physical Exam  Vitals and nursing note reviewed.   Constitutional:       General: She is not in acute distress.     Appearance: Normal appearance. She is well-developed. She is not toxic-appearing.   HENT:      Head: Normocephalic and atraumatic.   Eyes:      General: No scleral icterus.  Cardiovascular:      Pulses: Normal pulses.   Pulmonary:      Effort: Pulmonary effort is normal. No respiratory distress.   Abdominal:      General: Abdomen is flat.      Palpations: Abdomen is soft.      Tenderness: There is generalized no abdominal tenderness. There is no guarding or rebound.   Musculoskeletal:         General: Normal range of motion.      Cervical back: Normal range of motion and neck supple.   Skin:     General: Skin is warm and dry.      Capillary Refill: Capillary refill takes less than 2 seconds.      Findings: Erythema (NUMEROUS LESIONS TO UPPER/LOWER EXTRMITIES VISIBLE) present.   Neurological:      General: No focal deficit present.      Mental Status: She is alert and oriented to person, place, and time. Mental status is at baseline.         Procedures           ED Course  ED Course as of 03/13/24 2157   Wed Mar 13, 2024   2118 The patient was instructed to stay for further testing.  She states that she did not have time for this.  The patient is uncontrollably moving in the bed.  The gentleman at the bedside stated that he would take her from here to [facility] if she left.  The patient was instructed to return to the emergency department at any time should she  decide she would want further testing and treatment.  She verbalized understanding.  The gentleman in the room also verbalized understanding. [TC]      ED Course User Index  [TC] Smita Silva APRN                                             Medical Decision Making  Problems Addressed:  Generalized abdominal pain: complicated acute illness or injury    Amount and/or Complexity of Data Reviewed  Labs: ordered.    Risk  Prescription drug management.        Final diagnoses:   Generalized abdominal pain       ED Disposition  ED Disposition       ED Disposition   AMA    Condition   --    Comment   --               No follow-up provider specified.       Medication List        ASK your doctor about these medications      Vraylar 3 MG capsule capsule  Generic drug: Cariprazine HCl  Take 1 capsule by mouth Daily for 90 days.  Ask about: Should I take this medication?                 Smita Silva APRN  03/13/24 6929

## 2024-05-08 ENCOUNTER — OFFICE VISIT (OUTPATIENT)
Dept: SLEEP MEDICINE | Facility: HOSPITAL | Age: 39
End: 2024-05-08
Payer: MEDICAID

## 2024-05-08 VITALS
WEIGHT: 165.9 LBS | HEIGHT: 64 IN | BODY MASS INDEX: 28.32 KG/M2 | SYSTOLIC BLOOD PRESSURE: 105 MMHG | HEART RATE: 71 BPM | OXYGEN SATURATION: 100 % | DIASTOLIC BLOOD PRESSURE: 71 MMHG

## 2024-05-08 DIAGNOSIS — G25.81 RLS (RESTLESS LEGS SYNDROME): ICD-10-CM

## 2024-05-08 DIAGNOSIS — F90.9 ATTENTION DEFICIT HYPERACTIVITY DISORDER (ADHD), UNSPECIFIED ADHD TYPE: ICD-10-CM

## 2024-05-08 DIAGNOSIS — F31.9 BIPOLAR 1 DISORDER: ICD-10-CM

## 2024-05-08 DIAGNOSIS — G47.419 NARCOLEPSY WITHOUT CATAPLEXY: ICD-10-CM

## 2024-05-08 DIAGNOSIS — E66.9 CLASS 1 OBESITY: Primary | ICD-10-CM

## 2024-05-08 PROCEDURE — G0463 HOSPITAL OUTPT CLINIC VISIT: HCPCS

## 2024-05-08 RX ORDER — CARIPRAZINE 1.5 MG/1
CAPSULE, GELATIN COATED ORAL
COMMUNITY
Start: 2024-04-22

## 2024-05-08 RX ORDER — LINACLOTIDE 145 UG/1
1 CAPSULE, GELATIN COATED ORAL DAILY
COMMUNITY
Start: 2024-05-01

## 2024-05-08 RX ORDER — TRETINOIN 0.025 %
CREAM (GRAM) TOPICAL
COMMUNITY
Start: 2024-05-01

## 2024-05-08 RX ORDER — POLYETHYLENE GLYCOL 3350 17 G/17G
POWDER, FOR SOLUTION ORAL
COMMUNITY
Start: 2024-04-04

## 2024-05-08 RX ORDER — MULTIVITAMIN
TABLET ORAL
COMMUNITY
Start: 2024-01-11

## 2024-05-17 ENCOUNTER — HOSPITAL ENCOUNTER (EMERGENCY)
Facility: HOSPITAL | Age: 39
Discharge: HOME | End: 2024-05-17
Attending: EMERGENCY MEDICINE
Payer: MEDICAID

## 2024-05-17 VITALS
DIASTOLIC BLOOD PRESSURE: 81 MMHG | HEART RATE: 116 BPM | OXYGEN SATURATION: 95 % | SYSTOLIC BLOOD PRESSURE: 117 MMHG | RESPIRATION RATE: 17 BRPM | TEMPERATURE: 98.7 F

## 2024-05-17 DIAGNOSIS — G43.809 OTHER MIGRAINE WITHOUT STATUS MIGRAINOSUS, NOT INTRACTABLE: Primary | ICD-10-CM

## 2024-05-17 PROCEDURE — 99282 EMERGENCY DEPT VISIT SF MDM: CPT

## 2024-05-17 RX ORDER — DIPHENHYDRAMINE HYDROCHLORIDE 50 MG/ML
50 INJECTION INTRAMUSCULAR; INTRAVENOUS ONCE
Status: DISCONTINUED | OUTPATIENT
Start: 2024-05-17 | End: 2024-05-17 | Stop reason: HOSPADM

## 2024-05-17 RX ORDER — DEXAMETHASONE SODIUM PHOSPHATE 10 MG/ML
8 INJECTION, SOLUTION INTRAMUSCULAR; INTRAVENOUS ONCE
Status: DISCONTINUED | OUTPATIENT
Start: 2024-05-17 | End: 2024-05-17 | Stop reason: HOSPADM

## 2024-05-17 RX ORDER — METOCLOPRAMIDE HYDROCHLORIDE 5 MG/ML
10 INJECTION INTRAMUSCULAR; INTRAVENOUS ONCE
Status: DISCONTINUED | OUTPATIENT
Start: 2024-05-17 | End: 2024-05-17 | Stop reason: HOSPADM

## 2024-05-17 RX ORDER — BUTALBITAL, ACETAMINOPHEN AND CAFFEINE 50; 325; 40 MG/1; MG/1; MG/1
1 TABLET ORAL EVERY 6 HOURS PRN
Qty: 20 TABLET | Refills: 0 | Status: SHIPPED | OUTPATIENT
Start: 2024-05-17

## 2024-05-17 RX ORDER — KETOROLAC TROMETHAMINE 30 MG/ML
30 INJECTION, SOLUTION INTRAMUSCULAR; INTRAVENOUS ONCE
Status: DISCONTINUED | OUTPATIENT
Start: 2024-05-17 | End: 2024-05-17 | Stop reason: HOSPADM

## 2024-05-17 NOTE — Clinical Note
[facility] EMERGENCY ROOM  [address] [place] [address]  Phone: [phone]  Fax: [phone]    Jose Coyle was seen and treated in our emergency department on 5/17/2024.  She may return to work on 05/17/2024.         Thank you for choosing [facility].    Bertram Santos MD       Yes No

## 2024-05-17 NOTE — Clinical Note
[facility] EMERGENCY ROOM  [address] [place] [address]  Phone: [phone]  Fax: [phone]    Jose Coyle was seen and treated in our emergency department on 5/17/2024.  She may return to work on 05/20/2024.         Thank you for choosing [facility].    Bertram Santos MD

## 2024-05-17 NOTE — Clinical Note
[facility] EMERGENCY ROOM  [address] [place] [address]  Phone: [phone]  Fax: [phone]    Jose Coyle was seen and treated in our emergency department on 5/17/2024.  She may return to work on 05/20/2024.         Thank you for choosing [facility].    Bertram Santos MD

## 2024-05-17 NOTE — Clinical Note
[facility] EMERGENCY ROOM  [address] [place] [address]  Phone: [phone]  Fax: [phone]    Jose Coyle was seen and treated in our emergency department on 5/17/2024.  She may return to work on 05/20/2024.         Thank you for choosing [facility].    Bertram Santos MD

## 2024-05-17 NOTE — ED NOTES
Pt discharged home, she verbalized understanding of need to  her RX at the pharmacy. Pt ambulated out of ED.

## 2024-05-17 NOTE — ED PROVIDER NOTES
Time: 3:39 PM EDT  Date of encounter:  5/17/2024  Independent Historian/Clinical History and Information was obtained by:   Patient    History is limited by: N/A    Chief Complaint: Migraine headache      History of Present Illness:  Patient is a 39 y.o. year old female who presents to the emergency department for evaluation of migraine headache.  Patient has a history of migraine headaches reports this 1 has been unrelieved and she believes is due to stress.  She does endorse photophobia.    HPI    Patient Care Team  Primary Care Provider: Provider, Vanessa Known    Past Medical History:     No Known Allergies  Past Medical History:   Diagnosis Date    ADHD     Anxiety     Bipolar 1 disorder     Chronic pain     Depression     Migraine     RLS (restless legs syndrome)      History reviewed. No pertinent surgical history.  History reviewed. No pertinent family history.    Home Medications:  Prior to Admission medications    Medication Sig Start Date End Date Taking? Authorizing Provider   buPROPion XL (WELLBUTRIN XL) 300 MG 24 hr tablet Take 1 tablet by mouth Every Morning. 1/11/24   Annabel Warner DO   Linzess 145 MCG capsule capsule Take 1 capsule by mouth Daily. 5/1/24   Maria Fernanda Breaux MD   Lucemyra 0.18 MG tablet  1/9/24   Maria Fernanda Breaux MD   melatonin 5 MG tablet tablet Take 1 tablet by mouth Every Night. 12/13/23   Annabel Warner DO   Multivitamin tablet tablet  1/11/24   Maria Fernanda rBeaux MD   polyethylene glycol (MIRALAX) 17 GM/SCOOP powder mix and drink 8 capfuls BY MOUTH once, then mix and drink ONE capful DAILY 4/4/24   Maria Fernanda Breaux MD   Retin-A 0.025 % cream apply a pea sized amount to the face nightly, follow up with moisturizer 5/1/24   Maria Fernanda Breaux MD   rOPINIRole (REQUIP) 0.5 MG tablet Take 1/2 tablet at bedtime for three days, then increase to full tablet at bedtime for three days, then take 2 tablets at bedtime. 12/13/23   Annabel Warner DO   Sublocade 300  MG/1.5ML solution prefilled syringe INJECT 300MG SUBCUTANEOUSLY ONCE MONTHLY 11/20/23   ProviderMaria Fernanda MD   Vraylar 1.5 MG capsule capsule TAKE ONE CAPSULE BY MOUTH EVERY DAY for major depressive disorder 4/22/24   Provider, MD Maria Fernanda        Social History:   Social History     Tobacco Use    Smoking status: Every Day     Current packs/day: 1.00     Types: Cigarettes    Smokeless tobacco: Never   Vaping Use    Vaping status: Never Used   Substance Use Topics    Alcohol use: Not Currently    Drug use: Not Currently     Comment: fentanyl         Review of Systems:  Review of Systems   Constitutional:  Negative for chills and fever.   HENT:  Negative for congestion, rhinorrhea and sore throat.    Eyes:  Negative for pain and visual disturbance.   Respiratory:  Negative for apnea, cough, chest tightness and shortness of breath.    Cardiovascular:  Negative for chest pain and palpitations.   Gastrointestinal:  Negative for abdominal pain, diarrhea, nausea and vomiting.   Genitourinary:  Negative for difficulty urinating and dysuria.   Musculoskeletal:  Negative for joint swelling and myalgias.   Skin:  Negative for color change.   Neurological:  Negative for seizures and headaches.   Psychiatric/Behavioral: Negative.     All other systems reviewed and are negative.       Physical Exam:  /81   Pulse 93   Temp 98.7 °F (37.1 °C) (Oral)   Resp 17   SpO2 98%     Physical Exam  Vitals and nursing note reviewed.   Constitutional:       General: She is not in acute distress.     Appearance: Normal appearance. She is not toxic-appearing.   HENT:      Head: Normocephalic and atraumatic.      Jaw: There is normal jaw occlusion.   Eyes:      General: Lids are normal.      Extraocular Movements: Extraocular movements intact.      Conjunctiva/sclera: Conjunctivae normal.      Pupils: Pupils are equal, round, and reactive to light.   Cardiovascular:      Rate and Rhythm: Normal rate and regular rhythm.       Pulses: Normal pulses.      Heart sounds: Normal heart sounds.   Pulmonary:      Effort: Pulmonary effort is normal. No respiratory distress.      Breath sounds: Normal breath sounds. No wheezing or rhonchi.   Abdominal:      General: Abdomen is flat.      Palpations: Abdomen is soft.      Tenderness: There is no abdominal tenderness. There is no guarding or rebound.   Musculoskeletal:         General: Normal range of motion.      Cervical back: Normal range of motion and neck supple.      Right lower leg: No edema.      Left lower leg: No edema.   Skin:     General: Skin is warm and dry.   Neurological:      Mental Status: She is alert and oriented to person, place, and time. Mental status is at baseline.   Psychiatric:         Mood and Affect: Mood normal.                  Procedures:  Procedures      Medical Decision Making:      Comorbidities that affect care:    Bipolar disorder, migraine headaches    External Notes reviewed:    None      The following orders were placed and all results were independently analyzed by me:  No orders of the defined types were placed in this encounter.      Medications Given in the Emergency Department:  Medications   metoclopramide (REGLAN) injection 10 mg (has no administration in time range)   diphenhydrAMINE (BENADRYL) injection 50 mg (has no administration in time range)   ketorolac (TORADOL) injection 30 mg (has no administration in time range)   dexAMETHasone sodium phosphate injection 8 mg (has no administration in time range)   sodium chloride 0.9 % bolus 1,000 mL (has no administration in time range)        ED Course:         Labs:    Lab Results (last 24 hours)       ** No results found for the last 24 hours. **             Imaging:    No Radiology Exams Resulted Within Past 24 Hours      Differential Diagnosis and Discussion:    Headache: Differential diagnosis includes but is not limited to migraine, cluster headache, hypertension, tumor, subarachnoid bleeding,  pseudotumor cerebri, temporal arteritis, infections, tension headache, and TMJ syndrome.        MDM  Number of Diagnoses or Management Options  Other migraine without status migrainosus, not intractable  Diagnosis management comments: In summary this is a 39-year-old female with a history of headaches who presents to the emergency department for evaluation and treatment of migraine headache.  No focal neurologic deficits, no thunderclap or sudden onset, no neck pain or rigidity, no fevers.  No concerning signs or symptoms to patient's headache complaint today.  She was given typical migraine headache cocktail medications with improvement of her symptoms.  Very strict return to ER and follow-up instructions have been provided to the patient.                   Patient Care Considerations:    CT HEAD: I considered ordering a noncontrast CT of the head, however no focal neurologic deficit      Consultants/Shared Management Plan:    None    Social Determinants of Health:    Patient is independent, reliable, and has access to care.       Disposition and Care Coordination:    Discharged: The patient is suitable and stable for discharge with no need for consideration of admission.    I have explained the patient´s condition, diagnoses and treatment plan based on the information available to me at this time. I have answered questions and addressed any concerns. The patient has a good  understanding of the patient´s diagnosis, condition, and treatment plan as can be expected at this point. The vital signs have been stable. The patient´s condition is stable and appropriate for discharge from the emergency department.      The patient will pursue further outpatient evaluation with the primary care physician or other designated or consulting physician as outlined in the discharge instructions. They are agreeable to this plan of care and follow-up instructions have been explained in detail. The patient has received these  instructions in written format and has expressed an understanding of the discharge instructions. The patient is aware that any significant change in condition or worsening of symptoms should prompt an immediate return to this or the closest emergency department or call to 911.  I have explained discharge medications and the need for follow up with the patient/caretakers. This was also printed in the discharge instructions. Patient was discharged with the following medications and follow up:      Medication List        New Prescriptions      butalbital-acetaminophen-caffeine -40 MG per tablet  Commonly known as: FIORICET, ESGIC  Take 1 tablet by mouth Every 6 (Six) Hours As Needed for Headache.               Where to Get Your Medications        These medications were sent to [facility] [place], [place] - [address] [facility] - [phone] [facility] [phone]   [address] [facility] [address]      Phone: [phone]   butalbital-acetaminophen-caffeine -40 MG per tablet      Provider, No Known  [facility] [id number]    In 1 week         Final diagnoses:   Other migraine without status migrainosus, not intractable        ED Disposition       ED Disposition   Discharge    Condition   Stable    Comment   --               This medical record created using voice recognition software.             Bertram Santos MD  05/17/24 3910

## 2024-05-17 NOTE — ED NOTES
This RN and GUILLERMO Walker attempted IV x 3 without success pt states she doesn't want to be stuck again and prefers something by mouth for pain. Will notify provider.

## 2024-05-17 NOTE — Clinical Note
[facility] EMERGENCY ROOM  [address] [place] [address]  Phone: [phone]  Fax: [phone]    Jose Coyle was seen and treated in our emergency department on 5/17/2024.  She may return to work on 05/18/2024.         Thank you for choosing [facility].    Bertrma Santos MD

## 2024-05-17 NOTE — Clinical Note
[facility] EMERGENCY ROOM  [address] [place] [address]  Phone: [phone]  Fax: [phone]    Jose Coyle was seen and treated in our emergency department on 5/17/2024.  She may return to work on 05/17/2024.         Thank you for choosing [facility].    Bertram Santos MD

## 2024-05-22 ENCOUNTER — HOSPITAL ENCOUNTER (EMERGENCY)
Facility: HOSPITAL | Age: 39
Discharge: PSYCHIATRIC OR SUBSTANCE USE FACILITY | DRG: 885 | End: 2024-05-22
Attending: EMERGENCY MEDICINE
Payer: MEDICAID

## 2024-05-22 ENCOUNTER — HOSPITAL ENCOUNTER (INPATIENT)
Facility: HOSPITAL | Age: 39
LOS: 5 days | Discharge: HOME | DRG: 885 | End: 2024-05-27
Attending: PSYCHIATRY & NEUROLOGY | Admitting: PSYCHIATRY & NEUROLOGY
Payer: MEDICAID

## 2024-05-22 VITALS
TEMPERATURE: 97.3 F | OXYGEN SATURATION: 99 % | SYSTOLIC BLOOD PRESSURE: 110 MMHG | HEIGHT: 64 IN | BODY MASS INDEX: 26.46 KG/M2 | DIASTOLIC BLOOD PRESSURE: 87 MMHG | WEIGHT: 154.98 LBS | HEART RATE: 87 BPM | RESPIRATION RATE: 18 BRPM

## 2024-05-22 DIAGNOSIS — R45.851 SUICIDAL IDEATION: Primary | ICD-10-CM

## 2024-05-22 PROBLEM — F32.A DEPRESSION: Status: ACTIVE | Noted: 2024-05-22

## 2024-05-22 LAB
ALBUMIN SERPL-MCNC: 4.1 G/DL (ref 3.5–5.2)
ALBUMIN/GLOB SERPL: 1.3 G/DL
ALP SERPL-CCNC: 78 U/L (ref 39–117)
ALT SERPL W P-5'-P-CCNC: 23 U/L (ref 1–33)
AMPHET+METHAMPHET UR QL: POSITIVE
ANION GAP SERPL CALCULATED.3IONS-SCNC: 12.1 MMOL/L (ref 5–15)
APAP SERPL-MCNC: <5 MCG/ML (ref 0–30)
AST SERPL-CCNC: 37 U/L (ref 1–32)
BARBITURATES UR QL SCN: POSITIVE
BASOPHILS # BLD AUTO: 0.1 10*3/MM3 (ref 0–0.2)
BASOPHILS NFR BLD AUTO: 0.8 % (ref 0–1.5)
BENZODIAZ UR QL SCN: NEGATIVE
BILIRUB SERPL-MCNC: 0.7 MG/DL (ref 0–1.2)
BUN SERPL-MCNC: 14 MG/DL (ref 6–20)
BUN/CREAT SERPL: 19.7 (ref 7–25)
CALCIUM SPEC-SCNC: 8.7 MG/DL (ref 8.6–10.5)
CANNABINOIDS SERPL QL: NEGATIVE
CHLORIDE SERPL-SCNC: 105 MMOL/L (ref 98–107)
CO2 SERPL-SCNC: 19.9 MMOL/L (ref 22–29)
COCAINE UR QL: POSITIVE
CREAT SERPL-MCNC: 0.71 MG/DL (ref 0.57–1)
DEPRECATED RDW RBC AUTO: 37.2 FL (ref 37–54)
EGFRCR SERPLBLD CKD-EPI 2021: 109.7 ML/MIN/1.73
EOSINOPHIL # BLD AUTO: 0.31 10*3/MM3 (ref 0–0.4)
EOSINOPHIL NFR BLD AUTO: 2.6 % (ref 0.3–6.2)
ERYTHROCYTE [DISTWIDTH] IN BLOOD BY AUTOMATED COUNT: 12.2 % (ref 12.3–15.4)
ETHANOL BLD-MCNC: <10 MG/DL (ref 0–10)
ETHANOL UR QL: <0.01 %
FENTANYL UR-MCNC: NEGATIVE NG/ML
GLOBULIN UR ELPH-MCNC: 3.1 GM/DL
GLUCOSE SERPL-MCNC: 87 MG/DL (ref 65–99)
HCG SERPL QL: NEGATIVE
HCT VFR BLD AUTO: 36.4 % (ref 34–46.6)
HGB BLD-MCNC: 12.7 G/DL (ref 12–15.9)
HOLD SPECIMEN: NORMAL
IMM GRANULOCYTES # BLD AUTO: 0.03 10*3/MM3 (ref 0–0.05)
IMM GRANULOCYTES NFR BLD AUTO: 0.3 % (ref 0–0.5)
LYMPHOCYTES # BLD AUTO: 3.68 10*3/MM3 (ref 0.7–3.1)
LYMPHOCYTES NFR BLD AUTO: 30.8 % (ref 19.6–45.3)
MCH RBC QN AUTO: 29.7 PG (ref 26.6–33)
MCHC RBC AUTO-ENTMCNC: 34.9 G/DL (ref 31.5–35.7)
MCV RBC AUTO: 85 FL (ref 79–97)
METHADONE UR QL SCN: NEGATIVE
MONOCYTES # BLD AUTO: 1.49 10*3/MM3 (ref 0.1–0.9)
MONOCYTES NFR BLD AUTO: 12.5 % (ref 5–12)
NEUTROPHILS NFR BLD AUTO: 53 % (ref 42.7–76)
NEUTROPHILS NFR BLD AUTO: 6.32 10*3/MM3 (ref 1.7–7)
NRBC BLD AUTO-RTO: 0 /100 WBC (ref 0–0.2)
OPIATES UR QL: NEGATIVE
OXYCODONE UR QL SCN: NEGATIVE
PLATELET # BLD AUTO: 344 10*3/MM3 (ref 140–450)
PMV BLD AUTO: 10.3 FL (ref 6–12)
POTASSIUM SERPL-SCNC: 3.7 MMOL/L (ref 3.5–5.2)
PROT SERPL-MCNC: 7.2 G/DL (ref 6–8.5)
RBC # BLD AUTO: 4.28 10*6/MM3 (ref 3.77–5.28)
SALICYLATES SERPL-MCNC: <0.3 MG/DL
SODIUM SERPL-SCNC: 137 MMOL/L (ref 136–145)
T4 FREE SERPL-MCNC: 1.56 NG/DL (ref 0.92–1.68)
TSH SERPL DL<=0.05 MIU/L-ACNC: 0.23 UIU/ML (ref 0.27–4.2)
WBC NRBC COR # BLD AUTO: 11.93 10*3/MM3 (ref 3.4–10.8)
WHOLE BLOOD HOLD COAG: NORMAL
WHOLE BLOOD HOLD SPECIMEN: NORMAL

## 2024-05-22 PROCEDURE — 80307 DRUG TEST PRSMV CHEM ANLYZR: CPT | Performed by: EMERGENCY MEDICINE

## 2024-05-22 PROCEDURE — 84439 ASSAY OF FREE THYROXINE: CPT | Performed by: EMERGENCY MEDICINE

## 2024-05-22 PROCEDURE — 82077 ASSAY SPEC XCP UR&BREATH IA: CPT | Performed by: EMERGENCY MEDICINE

## 2024-05-22 PROCEDURE — 84703 CHORIONIC GONADOTROPIN ASSAY: CPT | Performed by: EMERGENCY MEDICINE

## 2024-05-22 PROCEDURE — 80050 GENERAL HEALTH PANEL: CPT | Performed by: EMERGENCY MEDICINE

## 2024-05-22 PROCEDURE — 99285 EMERGENCY DEPT VISIT HI MDM: CPT

## 2024-05-22 PROCEDURE — 80179 DRUG ASSAY SALICYLATE: CPT | Performed by: EMERGENCY MEDICINE

## 2024-05-22 PROCEDURE — 36415 COLL VENOUS BLD VENIPUNCTURE: CPT

## 2024-05-22 PROCEDURE — 80143 DRUG ASSAY ACETAMINOPHEN: CPT | Performed by: EMERGENCY MEDICINE

## 2024-05-22 RX ORDER — IBUPROFEN 400 MG/1
800 TABLET ORAL ONCE
Status: DISCONTINUED | OUTPATIENT
Start: 2024-05-22 | End: 2024-05-22 | Stop reason: HOSPADM

## 2024-05-22 RX ORDER — HALOPERIDOL 5 MG/1
5 TABLET ORAL EVERY 4 HOURS PRN
Status: DISCONTINUED | OUTPATIENT
Start: 2024-05-22 | End: 2024-05-27 | Stop reason: HOSPADM

## 2024-05-22 RX ORDER — LOPERAMIDE HYDROCHLORIDE 2 MG/1
2 CAPSULE ORAL
Status: DISCONTINUED | OUTPATIENT
Start: 2024-05-22 | End: 2024-05-27 | Stop reason: HOSPADM

## 2024-05-22 RX ORDER — HALOPERIDOL 5 MG/ML
5 INJECTION INTRAMUSCULAR EVERY 4 HOURS PRN
Status: DISCONTINUED | OUTPATIENT
Start: 2024-05-22 | End: 2024-05-27 | Stop reason: HOSPADM

## 2024-05-22 RX ORDER — FAMOTIDINE 20 MG/1
20 TABLET, FILM COATED ORAL 2 TIMES DAILY PRN
Status: DISCONTINUED | OUTPATIENT
Start: 2024-05-22 | End: 2024-05-27 | Stop reason: HOSPADM

## 2024-05-22 RX ORDER — HYDROXYZINE HYDROCHLORIDE 25 MG/1
50 TABLET, FILM COATED ORAL EVERY 6 HOURS PRN
Status: DISCONTINUED | OUTPATIENT
Start: 2024-05-22 | End: 2024-05-27 | Stop reason: HOSPADM

## 2024-05-22 RX ORDER — DIPHENHYDRAMINE HYDROCHLORIDE 50 MG/ML
50 INJECTION INTRAMUSCULAR; INTRAVENOUS EVERY 4 HOURS PRN
Status: DISCONTINUED | OUTPATIENT
Start: 2024-05-22 | End: 2024-05-27 | Stop reason: HOSPADM

## 2024-05-22 RX ORDER — ALUMINA, MAGNESIA, AND SIMETHICONE 2400; 2400; 240 MG/30ML; MG/30ML; MG/30ML
15 SUSPENSION ORAL EVERY 6 HOURS PRN
Status: DISCONTINUED | OUTPATIENT
Start: 2024-05-22 | End: 2024-05-27 | Stop reason: HOSPADM

## 2024-05-22 RX ORDER — TRAZODONE HYDROCHLORIDE 50 MG/1
50 TABLET ORAL NIGHTLY PRN
Status: DISCONTINUED | OUTPATIENT
Start: 2024-05-22 | End: 2024-05-27 | Stop reason: HOSPADM

## 2024-05-22 RX ORDER — ACETAMINOPHEN 325 MG/1
650 TABLET ORAL EVERY 6 HOURS PRN
Status: DISCONTINUED | OUTPATIENT
Start: 2024-05-22 | End: 2024-05-27 | Stop reason: HOSPADM

## 2024-05-22 RX ORDER — DIPHENHYDRAMINE HCL 50 MG
50 CAPSULE ORAL EVERY 4 HOURS PRN
Status: DISCONTINUED | OUTPATIENT
Start: 2024-05-22 | End: 2024-05-27 | Stop reason: HOSPADM

## 2024-05-22 RX ADMIN — ACETAMINOPHEN 650 MG: 325 TABLET ORAL at 23:20

## 2024-05-22 RX ADMIN — TRAZODONE HYDROCHLORIDE 50 MG: 50 TABLET ORAL at 23:20

## 2024-05-23 PROBLEM — G43.909 MIGRAINE: Status: ACTIVE | Noted: 2024-05-23

## 2024-05-23 PROBLEM — F14.10 COCAINE ABUSE: Status: ACTIVE | Noted: 2024-05-23

## 2024-05-23 PROBLEM — F15.10 AMPHETAMINE ABUSE: Status: ACTIVE | Noted: 2024-05-23

## 2024-05-23 RX ORDER — BUPROPION HYDROCHLORIDE 150 MG/1
300 TABLET ORAL EVERY MORNING
Status: DISCONTINUED | OUTPATIENT
Start: 2024-05-23 | End: 2024-05-27 | Stop reason: HOSPADM

## 2024-05-23 RX ORDER — BUTALBITAL, ACETAMINOPHEN AND CAFFEINE 300; 40; 50 MG/1; MG/1; MG/1
1 CAPSULE ORAL EVERY 6 HOURS PRN
Status: DISCONTINUED | OUTPATIENT
Start: 2024-05-23 | End: 2024-05-27 | Stop reason: HOSPADM

## 2024-05-23 RX ADMIN — CARIPRAZINE 1.5 MG: 1.5 CAPSULE, GELATIN COATED ORAL at 10:36

## 2024-05-23 RX ADMIN — BUTALBITAL, ACETAMINOPHEN AND CAFFEINE 1 CAPSULE: 300; 40; 50 CAPSULE ORAL at 11:09

## 2024-05-23 RX ADMIN — BUPROPION HYDROCHLORIDE 300 MG: 150 TABLET, EXTENDED RELEASE ORAL at 10:36

## 2024-05-23 NOTE — SIGNIFICANT NOTE
Nurses Station: 3 anklets, 1 headband, 1 bra, 3 cartons cigarettes, 1 deodorant, hello dave wallet, 5 razors with box of blades.     Breakroom: Brown Suitcase, Green Suitcase, Brown Hamper, Black garbage bag, Blue felt bag, Brown/Pink bag, 3 large blue bags with zipper, Red striped bag, Power pack, Silver suitcase, tan bag, Blue laundry basket, Teal bucket, Mesh bag, Pink/Blue bag
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
## 2024-05-23 NOTE — PLAN OF CARE
Goal Outcome Evaluation:  Plan of Care Reviewed With: patient  Patient Agreement with Plan of Care: disagrees (describe)     Progress: no change       See admission note

## 2024-05-23 NOTE — PLAN OF CARE
Goal Outcome Evaluation:  Plan of Care Reviewed With: patient  Patient Agreement with Plan of Care: agrees   Patient alert and oriented and compliant with medications. Patient denies S/I, H/I or hallucinations. Patient has been withdrawn to her room sleeping entire shift. No inappropriate or aggressive behavior noted. Will continue to monitor for changes in mood or behavior.
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
## 2024-05-24 RX ORDER — BUPRENORPHINE HYDROCHLORIDE AND NALOXONE HYDROCHLORIDE DIHYDRATE 8; 2 MG/1; MG/1
1 TABLET SUBLINGUAL DAILY
Status: DISCONTINUED | OUTPATIENT
Start: 2024-05-24 | End: 2024-05-27 | Stop reason: HOSPADM

## 2024-05-24 RX ADMIN — CARIPRAZINE 1.5 MG: 1.5 CAPSULE, GELATIN COATED ORAL at 08:27

## 2024-05-24 RX ADMIN — BUPRENORPHINE AND NALOXONE 1 TABLET: 8; 2 TABLET SUBLINGUAL at 17:54

## 2024-05-24 RX ADMIN — BUTALBITAL, ACETAMINOPHEN AND CAFFEINE 1 CAPSULE: 300; 40; 50 CAPSULE ORAL at 02:56

## 2024-05-24 RX ADMIN — BUPROPION HYDROCHLORIDE 300 MG: 150 TABLET, EXTENDED RELEASE ORAL at 08:27

## 2024-05-24 RX ADMIN — BUTALBITAL, ACETAMINOPHEN AND CAFFEINE 1 CAPSULE: 300; 40; 50 CAPSULE ORAL at 16:42

## 2024-05-24 NOTE — PROGRESS NOTES
" [facility]     Psychiatric Progress Note    Patient Name: Jose Coyle  : 1985  MRN: [id number]  Primary Care Physician:  Provider, No Known  Date of admission: 2024    Subjective   Subjective     Patient seen and chart reviewed, discussed with staff.    Chief Complaint: Depression, drug detoxification      HPI:     Staff anthony the patient has been isolative to her room.  She is making numerous request of staff, and appears to be reluctant to help herself.    Patient day reports that she is doing okay.  Reports that she looked well.  She is unable to identify what her mood is today.  She does deny suicidal homicidal ideation and hallucinations.  She does report feeling doshi and having mood swings.  States that she had been on Sublocade and she was due for her injection 2 or 3 days ago and thinks she is detoxing from buprenorphine.  States that she needs rest and fluid when he gets his detox.  About sober living      Objective   Objective     Vitals:   Temp:  [97.9 °F (36.6 °C)] 97.9 °F (36.6 °C)  Heart Rate:  [54-96] 96  Resp:  [16-18] 18  BP: (92-99)/(63-84) 92/67          Mental Status Exam:      Appearance:   Well-developed, well-nourished, lying in bed comfortably, difficult to engage, minimally responsive  Reliability:   Fair  Eye Contact:   Limited  Concentration/Focus:    Attentive to the interview  Behaviors:    No restlessness or agitation  Memory :    Intact  Speech:    Minimal, normal rate and volume  Language:   Appropriate  Mood :    \"Doshi\"  Affect:    Blunted  Thought process:    Goal directed, no paranoia or delusions elicited,  Thought Content:    Denies suicidal or homicidal ideation, no hallucinations  Insight:   Limited  Judgement:    No behavioral disturbance      Result Review    Result Review:  I have personally reviewed the results from the time of this admission to 2024 12:00 EDT and agree with these findings:  []  Laboratory  []  Microbiology  []  " Radiology  []  EKG/Telemetry   []  Cardiology/Vascular   []  Pathology  []  Old records  []  Other:  Most notable findings include:     Lab Results (last 24 hours)       ** No results found for the last 24 hours. **                Medications:   buPROPion XL, 300 mg, Oral, QAM  Cariprazine HCl, 1.5 mg, Oral, Daily          Assessment / Plan       Active Hospital Problems:  Active Hospital Problems    Diagnosis     Amphetamine abuse     Cocaine abuse     Migraine     Major depressive disorder, recurrent episode, moderate degree        Plan:     Continue current treatment protocol and titrate medications as clinically indicated  Work on mood stabilization and abatement of any suicidal ideation or psychosis.  Work on appropriate safety plan  Continue supportive therapy  Patient to engage in all group and individual treatment modalities available on the unit  Obtain collateral information if possible  Titrate medications as clinically indicated  Work on appropriate disposition follow-up including referrals to substance abuse treatment if indicated      Disposition:  I expect patient to be discharged 2 to 3 days.    Part of this note may be an electronic transcription/translation of spoken language to printed text using the Dragon dictation system.         Electronically signed by Karlos Ayala MD, 05/24/24, 12:00 PM EDT.

## 2024-05-24 NOTE — PLAN OF CARE
Goal Outcome Evaluation:  Plan of Care Reviewed With: patient  Patient Agreement with Plan of Care: agrees   Patient alert and oriented and compliant with medications. Patient denies S/I, H/I or hallucinations. Patient has been withdrawn to her room lying in bed all day and refused groups. No inappropriate or aggressive behavior noted. Will continue to monitor for changes in mood or behavior.

## 2024-05-24 NOTE — SIGNIFICANT NOTE
05/24/24 1200   Plan   Plan Discussed discharge plan with pt today. Patient reports today she will be discharging to [facility]. Pt reports she needed contact number to make contact. Pt provided with phone number to follow up and is advised to speak with staff if sober living requires further treatment information.   Patient/Family in Agreement with Plan yes  ([facility])   Final Discharge Disposition Code 30 - still a patient

## 2024-05-24 NOTE — PLAN OF CARE
Goal Outcome Evaluation:  Plan of Care Reviewed With: patient  Patient Agreement with Plan of Care: agrees     Progress: no change     Patient rating anxiety 7/10 and depression 7/10. Denies HI/SI. Denies AVH. Patient has underlying irritability present. Guarded during RN assessment. Has been withdrawn to room this shift. Able to make needs known. Will continue plan of care and provide a safe patient environment.                               HR=64 bpm, QWIR=533/83 mmhg, SpO2=96.0 %, Resp=16 B/min, EtCO2=38 mmHg, Apnea=2 Seconds, Pain=0, Rosa=3

## 2024-05-25 RX ORDER — IBUPROFEN 400 MG/1
400 TABLET ORAL EVERY 6 HOURS PRN
Status: DISCONTINUED | OUTPATIENT
Start: 2024-05-25 | End: 2024-05-27 | Stop reason: HOSPADM

## 2024-05-25 RX ORDER — BUPRENORPHINE HYDROCHLORIDE AND NALOXONE HYDROCHLORIDE DIHYDRATE 8; 2 MG/1; MG/1
1 TABLET SUBLINGUAL ONCE
Status: COMPLETED | OUTPATIENT
Start: 2024-05-25 | End: 2024-05-25

## 2024-05-25 RX ORDER — BUPROPION HYDROCHLORIDE 150 MG/1
300 TABLET ORAL ONCE
Status: COMPLETED | OUTPATIENT
Start: 2024-05-25 | End: 2024-05-25

## 2024-05-25 RX ADMIN — BUPROPION HYDROCHLORIDE 300 MG: 150 TABLET, EXTENDED RELEASE ORAL at 18:31

## 2024-05-25 RX ADMIN — TRAZODONE HYDROCHLORIDE 50 MG: 50 TABLET ORAL at 20:10

## 2024-05-25 RX ADMIN — IBUPROFEN 400 MG: 400 TABLET, FILM COATED ORAL at 22:14

## 2024-05-25 RX ADMIN — CARIPRAZINE 1.5 MG: 1.5 CAPSULE, GELATIN COATED ORAL at 18:31

## 2024-05-25 RX ADMIN — BUPRENORPHINE AND NALOXONE 1 TABLET: 8; 2 TABLET SUBLINGUAL at 18:31

## 2024-05-25 NOTE — PLAN OF CARE
Goal Outcome Evaluation:    Pt is alert, oriented, not in distress. The patient has been withdrawn to her room resting. Pt was cooperative with assessment, but minimally engaging. Pt denies current SI, HI or AVH. The patient rated her anxiety and depression 7/10. The patient did have a snack for nourishment. Care of this patient is ongoing.-- AS RN

## 2024-05-25 NOTE — PROGRESS NOTES
" [facility]     Psychiatric Progress Note    Patient Name: Jose Coyle  : 1985  MRN: [id number]  Primary Care Physician:  Provider, No Known  Date of admission: 2024    Subjective   Subjective     Patient seen and chart reviewed, discussed with staff.    Chief Complaint: Depression, drug detoxification      HPI:     Staff anthony the patient has been withdrawn to her room and minimally engaging; rates anxiety and depression at 7/10.     Patient is cooperative with my assessment. When asked about her mood today, she states: \"I don't know yet.\" She reports sleeping well overnight and reports she has been eating \"okay.\" She identifies her medications as being generally helpful and denies any side effects.  She denies suicidal ideation, homicidal ideation, hallucinations. She confirms she has been on Sublocade and that her injection was due a few days ago. She denies opioid withdrawal symptoms currently. She She denies any physical complaints currently. She specifically denies dizziness, lightheadedness, nausea, vomiting, diarrhea, abdominal pain, muscle aches. She appears in no distress and is observed to be lying in bed. She appears future-oriented and discusses plans of going to a sober living environment when she is ready for discharge.       Objective   Objective     Vitals:   Temp:  [97.5 °F (36.4 °C)-98.1 °F (36.7 °C)] 97.5 °F (36.4 °C)  Heart Rate:  [51-74] 64  Resp:  [16-18] 16  BP: (84-95)/(49-62) 95/50          Mental Status Exam:      Appearance:   Well-developed, well-nourished, lying in bed comfortably, engages some  Reliability:   Fair  Eye Contact:   Some  Concentration/Focus:    Attentive to the interview  Behaviors:    No restlessness or agitation  Memory :    Intact  Speech:    Minimal, normal rate and volume  Language:   Appropriate  Mood :    \"I don't know yet.\"  Affect:    Constricted  Thought process:    Goal directed, no paranoia or delusions elicited,  Thought Content:   "  Denies suicidal or homicidal ideation, denies hallucinations and does not appear to respond to stimuli  Insight:   Limited  Judgement:    No behavioral disturbance      Result Review    Result Review:  I have personally reviewed the results from the time of this admission to 5/25/2024 11:53 EDT and agree with these findings:  [x]  Laboratory  []  Microbiology  []  Radiology  []  EKG/Telemetry   []  Cardiology/Vascular   []  Pathology  []  Old records  []  Other:  Most notable findings include:     Lab Results (last 24 hours)       ** No results found for the last 24 hours. **                Medications:   buprenorphine-naloxone, 1 tablet, Sublingual, Daily  buPROPion XL, 300 mg, Oral, QAM  Cariprazine HCl, 1.5 mg, Oral, Daily          Assessment / Plan       Active Hospital Problems:  Active Hospital Problems    Diagnosis     Amphetamine abuse     Cocaine abuse     Migraine     Major depressive disorder, recurrent episode, moderate degree        Plan:     Continue current treatment protocol and titrate medications as clinically indicated. Blood pressure was slightly low yesterday, and Buprenorphine dose held. Blood pressure improved but still on low end this morning. Patient is asymptomatic. Continue to monitor and encouraged patient to stay hydrated. Consider a Hospitalist consult if consistently low or patient develops symptoms.   Work on mood stabilization and work on appropriate safety plan. Patient denies suicidal ideation and appears future-oriented.  Continue supportive therapy.  Patient to engage in all group and individual treatment modalities available on the unit.  Obtain collateral information if possible.  Titrate medications as clinically indicated.  Work on appropriate disposition follow-up including referrals to substance abuse treatment if indicated.      Disposition:  I expect patient to be discharged 2 to 3 days.

## 2024-05-25 NOTE — PLAN OF CARE
"Goal Outcome Evaluation:  Plan of Care Reviewed With: patient  Patient Agreement with Plan of Care: agrees                              Patient has been withdrawn to room for majority of day. Patient refused 2 of her morning medications and patient's Suboxone was held by provider due to hypotension. Patient was encouraged to walk around the unit and drink plenty of fluids to help raise blood pressure, but patient refused and said \"Forget it, I don't want any of it.\" Patient later awoke and requested Suboxone, but refused to take the Wellbutrin and Vraylar again. Contacted provider about request. Patient remained hypotensive and provider continued to hold medication. Patient encouraged again to drink plenty of fluids and move around more. Patient did walk the unit and drink Gatorade, but then returned to bed. Patient reports anxiety, 7/10 and denies depression, SI, HI, and A/VH. Patient's plan is to discharge to a sober living and is requesting to leave tomorrow, Sunday 05/26. Encouraged patient to speak to provider about this in the morning. Patient remains free from harm to self/others.   "

## 2024-05-26 ENCOUNTER — APPOINTMENT (OUTPATIENT)
Dept: GENERAL RADIOLOGY | Facility: HOSPITAL | Age: 39
DRG: 885 | End: 2024-05-26
Payer: MEDICAID

## 2024-05-26 PROCEDURE — 99221 1ST HOSP IP/OBS SF/LOW 40: CPT | Performed by: STUDENT IN AN ORGANIZED HEALTH CARE EDUCATION/TRAINING PROGRAM

## 2024-05-26 RX ADMIN — HYDROXYZINE HYDROCHLORIDE 50 MG: 25 TABLET, FILM COATED ORAL at 22:12

## 2024-05-26 RX ADMIN — CARIPRAZINE 1.5 MG: 1.5 CAPSULE, GELATIN COATED ORAL at 12:03

## 2024-05-26 RX ADMIN — TRAZODONE HYDROCHLORIDE 50 MG: 50 TABLET ORAL at 20:43

## 2024-05-26 RX ADMIN — BUPROPION HYDROCHLORIDE 300 MG: 150 TABLET, EXTENDED RELEASE ORAL at 12:03

## 2024-05-26 RX ADMIN — BUTALBITAL, ACETAMINOPHEN AND CAFFEINE 1 CAPSULE: 300; 40; 50 CAPSULE ORAL at 14:13

## 2024-05-26 RX ADMIN — BUPRENORPHINE AND NALOXONE 1 TABLET: 8; 2 TABLET SUBLINGUAL at 12:03

## 2024-05-26 NOTE — CONSULTS
[facility]   Hospitalist Consult Note  Date: 2024   Patient Name: Jose Coyle  : 1985  MRN: [id number]  Primary Care Physician:  Provider, No Known  Referring Physician: Karlos Ayala MD  Date of admission: 2024    Subjective   Subjective     Reason for Consult/ Chief Complaint: wrist edema    HPI:  Jose Coyle is a 39 y.o. female with past medical history of polysubstance abuse and depression.  She presented to [facility] for help with depression and drug withdrawal.  Hospitalist consulted as patient has been having some right wrist/hand swelling.  She reports some weakness, this is apparently chronic in nature but patient reports of dropping objects and difficulty using the hand.  She has previously used IV drugs but has been 5 to 6 years.  Patient denies trauma to the area.  Denies fevers, chills, diaphoresis.    Review of Systems   All systems were reviewed and negative     Personal History     Past Medical History:  Past Medical History:   Diagnosis Date    ADHD     Anxiety     Bipolar 1 disorder     Cancer     Ovarian/Cervical    Chronic pain     Depression     Migraine     RLS (restless legs syndrome)     Substance abuse         Past Surgical History:  Past Surgical History:   Procedure Laterality Date    HYSTERECTOMY          Family History:   Family History   Problem Relation Age of Onset    Drug abuse Father         Social History:   Social History     Socioeconomic History    Marital status:    Tobacco Use    Smoking status: Every Day     Current packs/day: 2.00     Types: Cigarettes    Smokeless tobacco: Never   Vaping Use    Vaping status: Some Days   Substance and Sexual Activity    Alcohol use: Not Currently    Drug use: Not Currently     Types: Methamphetamines     Comment: fentanyl    Sexual activity: Defer        Home Medications:  Buprenorphine ER, Cariprazine HCl, buPROPion XL, butalbital-acetaminophen-caffeine, linaclotide, and  tretinoin    Allergies:  No Known Allergies      Objective    Objective     Vitals:   Temp:  [98.1 °F (36.7 °C)-98.5 °F (36.9 °C)] 98.5 °F (36.9 °C)  Heart Rate:  [61-77] 64  Resp:  [16-18] 18  BP: ()/(56-81) 105/64    Physical Exam:  Gen: NAD, Alert and Oriented  Cards: RRR, no murmur   Pulm: CTA b/l, no wheezing  Abd: soft, nondistended  Extremities: no pitting edema.  Right wrist/hand with nonpitting edema.  Decreased strength in the right hand as compared to left.  Sensation intact.  No erythema, tenderness, or temperature change.    Result Review    Result Review:  I have personally reviewed the results from the time of this admission to 5/26/2024 13:09 EDT and agree with these findings:  [x]  Laboratory  []  Microbiology  []  Radiology  []  EKG/Telemetry   []  Cardiology/Vascular   []  Pathology  []  Old records  []  Other:    Assessment & Plan   Assessment / Plan     Assessment:  Depression  Polysubstance abuse  Previous IV drug abuse  Right wrist swelling      Plan:  Hospitalist consult for wrist edema  CBC, BMP, sed rate, CRP ordered  2-3 view wrist x-rays ordered  Suboxone, Wellbutrin, Vraylar per psychiatry  Tylenol and ibuprofen as needed for pain control  Elevate arm  Blood pressure appears to be at her baseline.  Reviewed previous outpatient office notes and systolic is usually in the low 100s.    Contacted by nursing that patient is refusing interventions.  Right wrist edema is likely okay to follow-up outpatient as it is chronic.  No warning signs of erythema, warmth, loss of sensation. If patient allows x-rays will review tomorrow and follow.  Otherwise, I will sign off.      DVT prophylaxis:  Mechanical DVT prophylaxis orders are present.        CODE STATUS:    Code Status (Patient has no pulse and is not breathing): CPR (Attempt to Resuscitate)  Medical Interventions (Patient has pulse or is breathing): Full Support      Electronically signed by Camila Jordan DO, 05/26/24, 1:09 PM EDT.

## 2024-05-26 NOTE — PLAN OF CARE
"Goal Outcome Evaluation:      Pt reports that she is feeling \"good\" this evening and that she feels she is ready to go to sober living. Pt is pleasant and appropriate with staff and peers. Compliant with medications and treatment. She plans to do an intake interview with sober living in the morning to get a bed ready for her upon DC. She denies SI/HI or hallucinations. Rates anxiety and depression 4. She reports some dull pain, numbness and slight swelling in her R wrist and hand. She reports that this is common for her, and was given Ibuprofen for this issue, per her request. Currently resting in bed with eyes closed, will continue to monitor and provide a safe environment.                                         "

## 2024-05-26 NOTE — PROGRESS NOTES
[facility]     Psychiatric Progress Note    Patient Name: Jose Coyle  : 1985  MRN: [id number]  Primary Care Physician:  Provider, No Known  Date of admission: 2024    Subjective   Subjective     Patient seen and chart reviewed, discussed with staff.    Chief Complaint: Depression, drug detoxification      HPI:     Staff reports the patient has been more engaging and has reported improvement in her mood; she has discusses plans to complete an intake with a sober living facility today; she rates anxiety and depression at 4/10.     Patient is cooperative with my assessment. She reports improvement in her mood and says she if feeling more positive. Her affect does appear brighter. She reports she slept well overnight and has had a fair appetite. She reports improvement in her energy levels. She denies suicidal ideation, homicidal ideation, hallucinations. She denies cravings for opioids. She continues to identify her current medications as beneficial and to deny any side effects. She discusses wanting to transition to a sober living facility. She says she has scheduled an intake with [facility]. She appears future-oriented and insightful, stating she knows she needs to continue to abstain from illicit substance use. Patient reports experiencing some swelling and mild discomfort in her right wrist yesterday. She says she has experienced this in the past and that it can be accompanied by some numbness and tingling. Patient says that Ibuprofen is usually helpful and did help some yesterday. She denies any injury to her right extremity. She denies weakness or swelling in other areas. She denies headache, vision changes, dizziness, lightheadedness. She denies opioid withdrawal symptoms. She denies any other physical complaints currently.     Objective   Objective     Vitals:   Temp:  [98.1 °F (36.7 °C)-98.5 °F (36.9 °C)] 98.5 °F (36.9 °C)  Heart Rate:  [61-77] 61  Resp:  [16-18] 18  BP:  "()/(56-81) 95/56    Physical Examination:    Strength is intact in all four extremities; trace non-pitting edema noted on wrist wrist; range of motion of the joint is intact; patient is able to extend her extend her fingers; when advised to make a fist, patient does not close her first completely; sensation of her bilateral upper extremities is intact.       Mental Status Exam:      Appearance:   Well-developed, well-nourished, lying in bed comfortably, engages some  Reliability:   Fair  Eye Contact:   Improved  Concentration/Focus:    Attentive to the interview  Behaviors:    No restlessness or agitation  Memory :    Intact  Speech:    Normal tone, normal rate and volume  Language:   Appropriate, fluent English  Mood :    \"Good\"  Affect:    Brighter, reactive  Thought process:    Goal directed, no paranoia or delusions elicited,  Thought Content:    Denies suicidal or homicidal ideation, denies hallucinations and does not appear to respond to stimuli  Insight:   Improving  Judgement:    No behavioral disturbance      Result Review    Result Review:  I have personally reviewed the results from the time of this admission to 5/26/2024 11:40 EDT and agree with these findings:  [x]  Laboratory  []  Microbiology  []  Radiology  []  EKG/Telemetry   []  Cardiology/Vascular   []  Pathology  []  Old records  []  Other:      Lab Results (last 24 hours)       ** No results found for the last 24 hours. **                Medications:   buprenorphine-naloxone, 1 tablet, Sublingual, Daily  buPROPion XL, 300 mg, Oral, QAM  Cariprazine HCl, 1.5 mg, Oral, Daily          Assessment / Plan       Active Hospital Problems:  Active Hospital Problems    Diagnosis     Amphetamine abuse     Cocaine abuse     Migraine     Major depressive disorder, recurrent episode, moderate degree        Plan:     Continue current treatment protocol and titrate medications as clinically indicated.   Work on mood stabilization and work on appropriate " safety plan. Patient denies suicidal ideation and appears future-oriented.  Continue supportive therapy.  Patient to engage in all group and individual treatment modalities available on the unit.  Obtain collateral information if possible.  Titrate medications as clinically indicated.  Work on appropriate disposition follow-up including referrals to substance abuse treatment if indicated. Patient reports she has an intake with [facility] today.   Hospitalist consult to evaluate right wrist swelling, discomfort. Blood pressure has improved and patient remains asymptomatic from this standpoint.       Disposition:  I expect patient to be discharged 1 to 2 days.

## 2024-05-26 NOTE — PLAN OF CARE
Goal Outcome Evaluation:  Plan of Care Reviewed With: patient  Patient Agreement with Plan of Care: agrees                              Patient is cooperative and calm throughout shift. Patient is medication compliant and is interested in d/c. Patient's plan is to go to [facility] where there is a bed reserved for her. Patient denies anxiety, depression, SI/HI, and A/VH. Patient was reporting a migraine and requested PRN Orbivan which was administered at 1413. Patient reported medication was effective. Patient remains free from harm to self/others at this time.

## 2024-05-27 VITALS
HEART RATE: 60 BPM | DIASTOLIC BLOOD PRESSURE: 63 MMHG | BODY MASS INDEX: 26.46 KG/M2 | OXYGEN SATURATION: 100 % | WEIGHT: 154.98 LBS | SYSTOLIC BLOOD PRESSURE: 86 MMHG | HEIGHT: 64 IN | RESPIRATION RATE: 20 BRPM | TEMPERATURE: 97.7 F

## 2024-05-27 RX ORDER — CARIPRAZINE 1.5 MG/1
1.5 CAPSULE, GELATIN COATED ORAL DAILY
Qty: 14 CAPSULE | Refills: 1 | Status: SHIPPED | OUTPATIENT
Start: 2024-05-27 | End: 2024-06-10

## 2024-05-27 RX ORDER — BUPROPION HYDROCHLORIDE 300 MG/1
300 TABLET ORAL EVERY MORNING
Qty: 14 TABLET | Refills: 1 | Status: SHIPPED | OUTPATIENT
Start: 2024-05-27 | End: 2024-06-10

## 2024-05-27 RX ADMIN — BUPRENORPHINE AND NALOXONE 1 TABLET: 8; 2 TABLET SUBLINGUAL at 08:27

## 2024-05-27 RX ADMIN — CARIPRAZINE 1.5 MG: 1.5 CAPSULE, GELATIN COATED ORAL at 08:27

## 2024-05-27 RX ADMIN — BUPROPION HYDROCHLORIDE 300 MG: 150 TABLET, EXTENDED RELEASE ORAL at 08:27

## 2024-05-27 NOTE — PLAN OF CARE
Goal Outcome Evaluation:  Plan of Care Reviewed With: patient  Patient Agreement with Plan of Care: agrees   Patient has reached all goals and will be discharged from unit. Patient going to [facility] for continued treatment.

## 2024-05-27 NOTE — PLAN OF CARE
Goal Outcome Evaluation:  Plan of Care Reviewed With: patient  Patient Agreement with Plan of Care: agrees         Pt A&O, no c/o pain, calm and cooperative, interacts with JEFF simms, rated anxiety and depression zero, denies SI and HI. Pt goal is to hopefully to go Sober Living tomorrow, hoping to get a job and stay there until she finish the course. Pt resting in bed, is able to make needs known, will continue current POC and a safe environment.

## 2024-05-27 NOTE — DISCHARGE SUMMARY
[facility]         DISCHARGE SUMMARY    Patient Name: Jose Coyle  : 1985  MRN: [id number]    Date of Admission: 2024  Date of Discharge:  2024  Primary Care Physician: Provider, No Known    Consults       No orders found from 2024 to 2024.            Presenting Problem:   Depression [F32.A]    Active and Resolved Hospital Problems:  Active Hospital Problems    Diagnosis POA   • Amphetamine abuse [F15.10] Yes   • Cocaine abuse [F14.10] Yes   • Migraine [G43.909] Yes   • Major depressive disorder, recurrent episode, moderate degree [F33.1] Yes      Resolved Hospital Problems   No resolved problems to display.         Hospital Course     Hospital Course:  Jose Coyle is a 39 y.o. female who was admitted to [facility] with depression and suicidal ideation. She was continued on Wellbutrin and Vraylar, and she was also started on sublingual Suboxone given she was recently due to a Sublocade injection for treatment of opioid use disorder. Patient was provided with pharmacotherapy and psychotherapy for treatment of depression. She expressed frustration about a recent return to use but maintained having a goal of abstaining from illicit substance use and indicated she wanted to return to a sober living facility when feeling better. Eventually, patient began to report improvement in her mood. She reported resolution of suicidal ideation. Her affect did appear to improve over the course of my assessments of the patient. She identified her medications as helpful and denied any side effect. She eventually reported that she was no longer feeling hopeless and that she was feeling more positive and hopeful. She appeared future-oriented and discussed exploring options for sober living and even set up appointments for intakes.      During this admission, patient did report some right wrist swelling and discomfort. She indicated that she has experienced this  before. A Hospitalist consult was ordered. An xray of her wrist was recommended, which patient declined. When asked about this, patient indicated that she did not believe she needed an x-ray because the swelling had resolved. She was agreeable with following-up with a primary care physician if symptoms returned or worsened. She did not appear in any distress, and the swelling did appear resolved the day after she initially reported it.     Patient consistently denied suicidal ideation and was observed to be less withdrawn and more engaging. She eventually requested discharge, and indicated she had been accepted at a sober living facility called [facility]. Patient appeared future-oriented and appropriate. Patient confirmed she would be provided with Suboxone at [facility] and would not need a prescription for that medication. Patient was offered continued hospitalization for further stabilization, however she declined this and continued to request discharge to a lower level of care. She did not appear to be an imminent threat to herself or others.     Patient advised of the risks associated with use of illicit opioids and other illicit substances. Her plan to continue to abstain from illicit substances was encouraged.     On day of discharge patient is pleasant and cooperative, and has no acute agitation or restlessness.  Speech is non-pressured with a regular tone and language is fluent.  Mood is described as euthymic and affect is congruent with this.  Thought processes are organized.  Thought content is reality-based.  Insight is improved, and judgment is improved. She denied suicidal ideation, homicidal ideation, hallucinations at discharge.       DISCHARGE Follow Up Recommendations for labs and diagnostics: Follow-up with PCP for monitoring cmp, bmp, sed rate, crp, xray of wrist, especially if swelling or discomfort return    Day of Discharge     Vital Signs:  Temp:  [97.7 °F (36.5 °C)-98.1 °F (36.7  °C)] 97.7 °F (36.5 °C)  Heart Rate:  [60-86] 60  Resp:  [18-20] 20  BP: ()/(63-73) 86/63      Pertinent  and/or Most Recent Results     LAB RESULTS:      Lab 05/22/24 1902   WBC 11.93*   HEMOGLOBIN 12.7   HEMATOCRIT 36.4   PLATELETS 344   NEUTROS ABS 6.32   IMMATURE GRANS (ABS) 0.03   LYMPHS ABS 3.68*   MONOS ABS 1.49*   EOS ABS 0.31   MCV 85.0         Lab 05/22/24 1902   SODIUM 137   POTASSIUM 3.7   CHLORIDE 105   CO2 19.9*   ANION GAP 12.1   BUN 14   CREATININE 0.71   EGFR 109.7   GLUCOSE 87   CALCIUM 8.7   TSH 0.232*         Lab 05/22/24 1902   TOTAL PROTEIN 7.2   ALBUMIN 4.1   GLOBULIN 3.1   ALT (SGPT) 23   AST (SGOT) 37*   BILIRUBIN 0.7   ALK PHOS 78                                     Lab 05/22/24 1902   ETHANOL PCT <0.010   ETHANOL MGDL <10         Lab 05/22/24 1913   AMPH/METHAM SCREEN, URINE Positive*   BENZODIAZEPINE SCREEN, URINE Negative   COCAINE SCREEN, URINE Positive*   OPIATES Negative   THC URINE SCREEN Negative   METHADONE SCREEN, URINE Negative     Brief Urine Lab Results  (Last result in the past 365 days)        Color   Clarity   Blood   Leuk Est   Nitrite   Protein   CREAT   Urine HCG        03/13/24 2033 Dark Yellow   Cloudy   Negative   Small (1+)   Negative   30 mg/dL (1+)                                       Imaging Results (Last 7 Days)       ** No results found for the last 168 hours. **             Labs Pending at Discharge:           Discharge Details        Discharge Medications        Changes to Medications        Instructions Start Date   buPROPion  MG 24 hr tablet  Commonly known as: WELLBUTRIN XL  What changed: Another medication with the same name was added. Make sure you understand how and when to take each.   300 mg, Oral, Every Morning      buPROPion  MG 24 hr tablet  Commonly known as: WELLBUTRIN XL  What changed: You were already taking a medication with the same name, and this prescription was added. Make sure you understand how and when to take  each.   300 mg, Oral, Every Morning      Vraylar 1.5 MG capsule capsule  Generic drug: Cariprazine HCl  What changed: how much to take   1.5 mg, Oral, Daily             Continue These Medications        Instructions Start Date   butalbital-acetaminophen-caffeine -40 MG per tablet  Commonly known as: FIORICET, ESGIC   1 tablet, Oral, Every 6 Hours PRN      Linzess 145 MCG capsule capsule  Generic drug: linaclotide   1 capsule, Oral, Daily      Retin-A 0.025 % cream  Generic drug: tretinoin   Apply 1 Application topically to the appropriate area as directed Every Night.      Sublocade 300 MG/1.5ML solution prefilled syringe  Generic drug: Buprenorphine ER   Inject 1.5 mL under the skin into the appropriate area as directed Every 30 (Thirty) Days.               No Known Allergies      Discharge Disposition:  Rehab Facility or Unit (DC - External)    Diet:  Hospital:No active diet order  Healthy diet      Discharge Activity: Ad walker.      Discharge Condition: Stable    CODE STATUS:  Code Status and Medical Interventions:   Ordered at: 05/22/24 2222     Code Status (Patient has no pulse and is not breathing):    CPR (Attempt to Resuscitate)     Medical Interventions (Patient has pulse or is breathing):    Full Support         Future appointments through [facility].         Time spent on Discharge including face to face service:  35 minutes      Electronically signed by Wali Nettles MD, 05/27/24, 12:49 PM EDT.